# Patient Record
Sex: FEMALE | Race: ASIAN | NOT HISPANIC OR LATINO | Employment: OTHER | URBAN - METROPOLITAN AREA
[De-identification: names, ages, dates, MRNs, and addresses within clinical notes are randomized per-mention and may not be internally consistent; named-entity substitution may affect disease eponyms.]

---

## 2019-03-12 ENCOUNTER — OFFICE VISIT CONVERTED (OUTPATIENT)
Dept: FAMILY MEDICINE CLINIC | Facility: CLINIC | Age: 64
End: 2019-03-12
Attending: FAMILY MEDICINE

## 2019-09-12 ENCOUNTER — OFFICE VISIT CONVERTED (OUTPATIENT)
Dept: FAMILY MEDICINE CLINIC | Facility: CLINIC | Age: 64
End: 2019-09-12
Attending: FAMILY MEDICINE

## 2019-09-13 ENCOUNTER — HOSPITAL ENCOUNTER (OUTPATIENT)
Dept: FAMILY MEDICINE CLINIC | Facility: CLINIC | Age: 64
Discharge: HOME | End: 2019-09-13
Attending: FAMILY MEDICINE

## 2019-09-13 LAB
25(OH)D3 SERPL-MCNC: 31.6 NG/ML (ref 30–100)
ALBUMIN SERPL-MCNC: 4.6 G/DL (ref 3.5–5)
ALBUMIN/GLOB SERPL: 1.8 {RATIO} (ref 1.4–2.6)
ALP SERPL-CCNC: 68 U/L (ref 43–160)
ALT SERPL-CCNC: 12 U/L (ref 10–40)
ANION GAP SERPL CALC-SCNC: 17 MMOL/L (ref 8–19)
AST SERPL-CCNC: 20 U/L (ref 15–50)
BASOPHILS # BLD AUTO: 0.04 10*3/UL (ref 0–0.2)
BASOPHILS NFR BLD AUTO: 0.6 % (ref 0–3)
BILIRUB SERPL-MCNC: 0.54 MG/DL (ref 0.2–1.3)
BUN SERPL-MCNC: 11 MG/DL (ref 5–25)
BUN/CREAT SERPL: 18 {RATIO} (ref 6–20)
CALCIUM SERPL-MCNC: 9.1 MG/DL (ref 8.7–10.4)
CHLORIDE SERPL-SCNC: 106 MMOL/L (ref 99–111)
CONV ABS IMM GRAN: 0.02 10*3/UL (ref 0–0.2)
CONV CO2: 25 MMOL/L (ref 22–32)
CONV IMMATURE GRAN: 0.3 % (ref 0–1.8)
CONV TOTAL PROTEIN: 7.2 G/DL (ref 6.3–8.2)
CREAT UR-MCNC: 0.62 MG/DL (ref 0.5–0.9)
DEPRECATED RDW RBC AUTO: 39.2 FL (ref 36.4–46.3)
EOSINOPHIL # BLD AUTO: 0.05 10*3/UL (ref 0–0.7)
EOSINOPHIL # BLD AUTO: 0.8 % (ref 0–7)
ERYTHROCYTE [DISTWIDTH] IN BLOOD BY AUTOMATED COUNT: 11.7 % (ref 11.7–14.4)
EST. AVERAGE GLUCOSE BLD GHB EST-MCNC: 140 MG/DL
GFR SERPLBLD BASED ON 1.73 SQ M-ARVRAT: >60 ML/MIN/{1.73_M2}
GLOBULIN UR ELPH-MCNC: 2.6 G/DL (ref 2–3.5)
GLUCOSE SERPL-MCNC: 110 MG/DL (ref 65–99)
HBA1C MFR BLD: 6.5 % (ref 3.5–5.7)
HCT VFR BLD AUTO: 41.7 % (ref 37–47)
HGB BLD-MCNC: 13.4 G/DL (ref 12–16)
LYMPHOCYTES # BLD AUTO: 2.28 10*3/UL (ref 1–5)
LYMPHOCYTES NFR BLD AUTO: 34.8 % (ref 20–45)
MCH RBC QN AUTO: 29.8 PG (ref 27–31)
MCHC RBC AUTO-ENTMCNC: 32.1 G/DL (ref 33–37)
MCV RBC AUTO: 92.7 FL (ref 81–99)
MONOCYTES # BLD AUTO: 0.36 10*3/UL (ref 0.2–1.2)
MONOCYTES NFR BLD AUTO: 5.5 % (ref 3–10)
NEUTROPHILS # BLD AUTO: 3.81 10*3/UL (ref 2–8)
NEUTROPHILS NFR BLD AUTO: 58 % (ref 30–85)
NRBC CBCN: 0 % (ref 0–0.7)
OSMOLALITY SERPL CALC.SUM OF ELEC: 296 MOSM/KG (ref 273–304)
PLATELET # BLD AUTO: 252 10*3/UL (ref 130–400)
PMV BLD AUTO: 11.6 FL (ref 9.4–12.3)
POTASSIUM SERPL-SCNC: 4.5 MMOL/L (ref 3.5–5.3)
RBC # BLD AUTO: 4.5 10*6/UL (ref 4.2–5.4)
SODIUM SERPL-SCNC: 143 MMOL/L (ref 135–147)
WBC # BLD AUTO: 6.56 10*3/UL (ref 4.8–10.8)

## 2020-03-12 ENCOUNTER — HOSPITAL ENCOUNTER (OUTPATIENT)
Dept: FAMILY MEDICINE CLINIC | Facility: CLINIC | Age: 65
Discharge: HOME | End: 2020-03-12
Attending: FAMILY MEDICINE

## 2020-03-12 ENCOUNTER — OFFICE VISIT CONVERTED (OUTPATIENT)
Dept: FAMILY MEDICINE CLINIC | Facility: CLINIC | Age: 65
End: 2020-03-12
Attending: FAMILY MEDICINE

## 2020-03-12 LAB
EST. AVERAGE GLUCOSE BLD GHB EST-MCNC: 137 MG/DL
HBA1C MFR BLD: 6.4 % (ref 3.5–5.7)

## 2020-09-14 ENCOUNTER — CONVERSION ENCOUNTER (OUTPATIENT)
Dept: FAMILY MEDICINE CLINIC | Facility: CLINIC | Age: 65
End: 2020-09-14

## 2020-09-14 ENCOUNTER — OFFICE VISIT CONVERTED (OUTPATIENT)
Dept: FAMILY MEDICINE CLINIC | Facility: CLINIC | Age: 65
End: 2020-09-14
Attending: FAMILY MEDICINE

## 2020-09-15 ENCOUNTER — HOSPITAL ENCOUNTER (OUTPATIENT)
Dept: FAMILY MEDICINE CLINIC | Facility: CLINIC | Age: 65
Discharge: HOME | End: 2020-09-15
Attending: FAMILY MEDICINE

## 2020-09-15 LAB
25(OH)D3 SERPL-MCNC: 36.2 NG/ML (ref 30–100)
ALBUMIN SERPL-MCNC: 4.4 G/DL (ref 3.5–5)
ALBUMIN/GLOB SERPL: 1.6 {RATIO} (ref 1.4–2.6)
ALP SERPL-CCNC: 66 U/L (ref 43–160)
ALT SERPL-CCNC: 14 U/L (ref 10–40)
ANION GAP SERPL CALC-SCNC: 14 MMOL/L (ref 8–19)
AST SERPL-CCNC: 22 U/L (ref 15–50)
BASOPHILS # BLD AUTO: 0.04 10*3/UL (ref 0–0.2)
BASOPHILS NFR BLD AUTO: 0.7 % (ref 0–3)
BILIRUB SERPL-MCNC: 0.43 MG/DL (ref 0.2–1.3)
BUN SERPL-MCNC: 14 MG/DL (ref 5–25)
BUN/CREAT SERPL: 18 {RATIO} (ref 6–20)
CALCIUM SERPL-MCNC: 9.1 MG/DL (ref 8.7–10.4)
CHLORIDE SERPL-SCNC: 103 MMOL/L (ref 99–111)
CHOLEST SERPL-MCNC: 203 MG/DL (ref 107–200)
CHOLEST/HDLC SERPL: 3.1 {RATIO} (ref 3–6)
CONV ABS IMM GRAN: 0.02 10*3/UL (ref 0–0.2)
CONV CO2: 28 MMOL/L (ref 22–32)
CONV CREATININE URINE, RANDOM: 24 MG/DL (ref 10–300)
CONV IMMATURE GRAN: 0.3 % (ref 0–1.8)
CONV MICROALBUM.,U,RANDOM: <12 MG/L (ref 0–20)
CONV TOTAL PROTEIN: 7.1 G/DL (ref 6.3–8.2)
CREAT UR-MCNC: 0.76 MG/DL (ref 0.5–0.9)
DEPRECATED RDW RBC AUTO: 41.3 FL (ref 36.4–46.3)
EOSINOPHIL # BLD AUTO: 0.04 10*3/UL (ref 0–0.7)
EOSINOPHIL # BLD AUTO: 0.7 % (ref 0–7)
ERYTHROCYTE [DISTWIDTH] IN BLOOD BY AUTOMATED COUNT: 11.7 % (ref 11.7–14.4)
EST. AVERAGE GLUCOSE BLD GHB EST-MCNC: 126 MG/DL
GFR SERPLBLD BASED ON 1.73 SQ M-ARVRAT: >60 ML/MIN/{1.73_M2}
GLOBULIN UR ELPH-MCNC: 2.7 G/DL (ref 2–3.5)
GLUCOSE SERPL-MCNC: 107 MG/DL (ref 65–99)
HBA1C MFR BLD: 6 % (ref 3.5–5.7)
HCT VFR BLD AUTO: 45.1 % (ref 37–47)
HDLC SERPL-MCNC: 66 MG/DL (ref 40–60)
HGB BLD-MCNC: 13.7 G/DL (ref 12–16)
LDLC SERPL CALC-MCNC: 120 MG/DL (ref 70–100)
LYMPHOCYTES # BLD AUTO: 2.17 10*3/UL (ref 1–5)
LYMPHOCYTES NFR BLD AUTO: 35.9 % (ref 20–45)
MCH RBC QN AUTO: 29.1 PG (ref 27–31)
MCHC RBC AUTO-ENTMCNC: 30.4 G/DL (ref 33–37)
MCV RBC AUTO: 96 FL (ref 81–99)
MICROALBUMIN/CREAT UR: 50 MG/G{CRE} (ref 0–35)
MONOCYTES # BLD AUTO: 0.33 10*3/UL (ref 0.2–1.2)
MONOCYTES NFR BLD AUTO: 5.5 % (ref 3–10)
NEUTROPHILS # BLD AUTO: 3.44 10*3/UL (ref 2–8)
NEUTROPHILS NFR BLD AUTO: 56.9 % (ref 30–85)
NRBC CBCN: 0 % (ref 0–0.7)
OSMOLALITY SERPL CALC.SUM OF ELEC: 293 MOSM/KG (ref 273–304)
PLATELET # BLD AUTO: 245 10*3/UL (ref 130–400)
PMV BLD AUTO: 11.2 FL (ref 9.4–12.3)
POTASSIUM SERPL-SCNC: 4.3 MMOL/L (ref 3.5–5.3)
RBC # BLD AUTO: 4.7 10*6/UL (ref 4.2–5.4)
SODIUM SERPL-SCNC: 141 MMOL/L (ref 135–147)
TRIGL SERPL-MCNC: 87 MG/DL (ref 40–150)
VLDLC SERPL-MCNC: 17 MG/DL (ref 5–37)
WBC # BLD AUTO: 6.04 10*3/UL (ref 4.8–10.8)

## 2020-09-18 ENCOUNTER — HOSPITAL ENCOUNTER (OUTPATIENT)
Dept: CARDIOLOGY | Facility: HOSPITAL | Age: 65
Discharge: HOME | End: 2020-09-18
Attending: FAMILY MEDICINE

## 2020-10-14 ENCOUNTER — OFFICE VISIT CONVERTED (OUTPATIENT)
Dept: FAMILY MEDICINE CLINIC | Facility: CLINIC | Age: 65
End: 2020-10-14
Attending: FAMILY MEDICINE

## 2020-11-02 ENCOUNTER — HOSPITAL ENCOUNTER (OUTPATIENT)
Dept: GENERAL RADIOLOGY | Facility: HOSPITAL | Age: 65
Discharge: HOME | End: 2020-11-02
Attending: FAMILY MEDICINE

## 2020-11-02 LAB
CREAT BLD-MCNC: 0.7 MG/DL (ref 0.6–1.4)
GFR SERPLBLD BASED ON 1.73 SQ M-ARVRAT: >60 ML/MIN/{1.73_M2}

## 2020-11-23 ENCOUNTER — OFFICE VISIT CONVERTED (OUTPATIENT)
Dept: FAMILY MEDICINE CLINIC | Facility: CLINIC | Age: 65
End: 2020-11-23
Attending: FAMILY MEDICINE

## 2021-02-23 ENCOUNTER — OFFICE VISIT CONVERTED (OUTPATIENT)
Dept: FAMILY MEDICINE CLINIC | Facility: CLINIC | Age: 66
End: 2021-02-23
Attending: FAMILY MEDICINE

## 2021-04-07 ENCOUNTER — HOSPITAL ENCOUNTER (OUTPATIENT)
Dept: GENERAL RADIOLOGY | Facility: HOSPITAL | Age: 66
Discharge: HOME | End: 2021-04-07
Attending: FAMILY MEDICINE

## 2021-04-22 ENCOUNTER — OFFICE VISIT CONVERTED (OUTPATIENT)
Dept: FAMILY MEDICINE CLINIC | Facility: CLINIC | Age: 66
End: 2021-04-22
Attending: FAMILY MEDICINE

## 2021-05-13 NOTE — PROGRESS NOTES
"   Progress Note      Patient Name: Trish Cool   Patient ID: [id number]   Sex: Female   YOB: 1955    Primary Care Provider: Artis Arce DO   Referring Provider: Artis Arce DO    Visit Date: October 14, 2020    Provider: Artis Arce DO   [place] [facility] [place]   Location Address: [address] [place] [address], [address]  [place], [place]  [id number]   Location Phone: [phone]          Chief Complaint  · follow up      History Of Present Illness  Trish Cool is a 65 year old  female who presents for evaluation and treatment of:      Patient presents today for follow-up.  Last time I saw her she was having some issues with dizziness.  She denies that she has had a sudden onset of dizziness but does report having some feeling of drooping on the right side of her mouth a couple months ago.  This has not persisted.  When the last time I saw her she was also experiencing a feeling of a \"pinched nerve\" in her neck causing some radiation of pain and tingling down her left arm.  She describes mainly tingling.  Further work-up was ordered.  Her orthostatics were appropriate except for an increase in her heart rate when going from a seated to a standing position.  This was tested again today and was not reproducible as seated her heart rate was 70 and standing it was 68.  I did order an echo for further evaluation.  This showed normal left ventricular systolic function with EF of 60% with grade 1 diastolic dysfunction of left ventricle with mild tricuspid regurgitation and normal calculated pulmonary artery systolic pressure.  The cervical spine x-ray showed mild degenerative changes present with mild straightening of the normal cervical lordosis.  Carotid Doppler was negative with no significant atherosclerotic plaquing in the left or right common or internal carotid arteries.  Her labs were reviewed.  Her A1c has improved down to 6.0%.  She has no anemia.  Her vitamin D level is " appropriate.  She has a slightly elevated albumin/creatinine ratio.  Her lipid panel shows an LDL of 120 and a total cholesterol of 203 with an HDL of 66.  She would like to work on lifestyle modification including diet and exercise first and foremost.  She is not interested in adding medication.  She does report that at times her blood pressure seems to be low.  She has not been checking it at home.  Today it is 98/64.  She denies any symptoms from this.  On her echo it did show interestingly that the inferior vena cava had more than 50% collapse during inspiration.  I suspect she does have some volume depletion that is causing symptoms.  I discussed with her the importance of staying well-hydrated and increasing salt intake.  I discussed with her further evaluation of her tingling in her left arm with an MRI of the cervical spine.  She is not interested in starting medication today.  Given the symptoms she had as described in the beginning of the HPI I will order a head CT as well to rule out any intracranial abnormality including possible stroke.  I will see her back in about 1 month for close follow-up.       Past Medical History  Arthritis; Cervical cancer screening; Colon cancer screening; Hemorrhoids; Osteoporosis; Screening Mammogram         Past Surgical History  *Denies any surgical procedures         Medication List  calcium carbonate 500 mg calcium (1,250 mg) oral tablet; Claritin 10 mg oral tablet; metformin 500 mg oral tablet; Vitamin D2 1,250 mcg (50,000 unit) oral capsule         Allergy List  NO KNOWN DRUG ALLERGIES         Family Medical History  *No Known Family History         Social History  Alcohol; Tobacco (Never)         Immunizations  Name Date Admin   Influenza Refused         Review of Systems     General: Denies any fever, chills, weight changes, or night sweats  HEENT:  Denies any vision or hearing changes. Denies any neck tenderness. Denies any headaches. Denies nasal  "congestion  Cardiovascular: Denies any chest pain or palpitations  respiratory: Denies any cough or wheezing. Denies any shortness of breath  Gastrointestinal: Denies any nausea vomiting or diarrhea, Denies constipation  Extremities: Denies any edema  Psychiatric: Denies any changes in mood or affect  Neurologic: As discussed above  skin: Denies any rashes or lesions.  endocrine: Denies any weight loss, fever, night sweats  Musculoskeletal: Denies any weakness       Vitals  Date Time BP Position Site L\R Cuff Size HR RR TEMP (F) WT  HT  BMI kg/m2 BSA m2 O2 Sat FR L/min FiO2 HC       10/14/2020 03:24 PM 98/64 Sitting    66 - R  98.2 136lbs 1oz 5'  7\" 21.31 1.71 98 %            Physical Examination     General: AAO 3, no acute distress, pleasant  HEENT: Normocephalic, atraumatic  Cardiovascular: Regular rate and rhythm without appreciable murmur  Respiratory: Clear to auscultation bilaterally no RRW  Gastrointestinal: Soft nontender nondistended with bowel sounds present  Musculoskeletal: Paraspinal hypertonicity of the cervical spine.  Nontender to palpation.  extremities: No clubbing, cyanosis or edema  Neurologic: CN II through XII grossly intact   Psychiatric: Normal mood and affect           Assessment  · Diabetes mellitus, type 2     250.00/E11.9  · Need for influenza vaccination     V04.81/Z23  · Cervical radiculopathy     723.4/M54.12  · Arm paresthesia, left     782.0/R20.2  · Stroke-like symptom     781.99/R29.90  · Dizziness     780.4/R42  · HLD (hyperlipidemia)     272.4/E78.5  · Medication monitoring encounter     V58.83/Z51.81  · Diastolic dysfunction     429.9/I51.89      Plan  · Orders  o ACO-39: Current medications updated and reviewed (, 1159F) - - 10/14/2020  o ACO-14: Influenza immunization administered or previously received [facility] () - - 10/14/2020  o MRI cervical spine wo contrast (75814) - 723.4/M54.12, 782.0/R20.2 - 10/14/2020   arm tingling for over a month. no injury  o CT Head/Brain " without IV Contrast [facility] (40901) - 782.0/R20.2, 781.99/R29.90, 780.4/R42 - 10/14/2020  · Instructions  o Patient was educated/instructed on their diagnosis, treatment and medications prior to discharge from the clinic today.  o Patient instructed to seek medical attention urgently for new or worsening symptoms.  o Call the office with any concerns or questions.  o Plan as documented above. I will see patient back in about 1 month or sooner if needed. Patient to call with any questions or concerns.  · Disposition  o Follow Up in 1 month.            Electronically Signed by: Artis Arce DO -Author on October 14, 2020 05:41:50 PM

## 2021-05-13 NOTE — PROGRESS NOTES
"   Progress Note      Patient Name: Trish Cool   Patient ID: [id number]   Sex: Female   YOB: 1955    Primary Care Provider: Artis Arce DO   Referring Provider: Artis Arce DO    Visit Date: September 14, 2020    Provider: Artis Arce DO   Location: [facility]   Location Address: [address] [place] [address], [address]  [place], [place]  [id number]   Location Phone: [phone]          Chief Complaint  · 6 month follow up      History Of Present Illness  Trish Cool is a 65 year old  female who presents for evaluation and treatment of:      Patient presents today for checkup.  She reports having dizziness at times.  Does not really occur with position changes although she was dizzy with sitting up today after having her EKG done.  She denies any chest pain.  She does have a \"pinched nerve\" in her neck that does cause some radiation of pain and numbness and tingling down her left arm.  This is not been evaluated in a while.  Discussed getting an x-ray.  She does not have any history of coronary artery disease.  I did order an EKG.  This showed sinus bradycardia with a ventricular rate of 59 bpm.  There is no axis deviation, [place] interval is 164 ms, QRS durations 84 ms, QTc interval is 422 ms.  There are no acute ST or T wave changes.  There does appear to be a 1 mm ST elevation in V2 but this is not noted in any contiguous leads.  She was otherwise asymptomatic during EKG but did get lightheaded when sitting up.  Orthostatics were performed.  Lying down heart rate was 68 with blood pressure 122/76, seated it was 70 heart rate with blood pressure 116/82 and standing it was 99 heart rate with blood pressure 118/68.  The heart rate increased was significant as it increased 30 beats from lying down.  She did have lightheadedness when going from lying down to seated position.  She has not passed out but feels like she could potentially.  She has not taken any medication for " "this.  Depression screening has been reviewed and it is negative.  She declines pneumococcal vaccine.  She does report some sinus fullness.       Past Medical History  Arthritis; Cervical cancer screening; Colon cancer screening; Hemorrhoids; Osteoporosis; Screening Mammogram         Past Surgical History  *Denies any surgical procedures         Medication List  calcium carbonate 500 mg calcium (1,250 mg) oral tablet; Claritin 10 mg oral tablet; metformin 500 mg oral tablet; Vitamin D2 1,250 mcg (50,000 unit) oral capsule         Allergy List  NO KNOWN DRUG ALLERGIES         Family Medical History  *No Known Family History         Social History  Alcohol; Tobacco (Never)         Immunizations  Name Date Admin   Influenza Refused         Review of Systems     General: Denies any fever, chills, weight changes, or night sweats  HEENT: As discussed above  Cardiovascular: She denies any chest pain or palpitations.  Presyncope as discussed above  respiratory: Denies any cough or wheezing. Denies any shortness of breath  Gastrointestinal: Denies any nausea vomiting or diarrhea, Denies constipation  Extremities: Denies any edema  Psychiatric: Denies any changes in mood or affect  Neurologic: As discussed above.  Denies any spinning sensation  skin: Denies any rashes or lesions.  endocrine: Denies any weight loss, fever, night sweats  Musculoskeletal: As discussed above       Vitals  Date Time BP Position Site L\R Cuff Size HR RR TEMP (F) WT  HT  BMI kg/m2 BSA m2 O2 Sat HC       09/14/2020 10:26 /76 Sitting    64 - R  97.3 136lbs 2oz 5'  7\" 21.32 1.71 99 %        09/14/2020 11:35 /76 Supine    68 - R   09/14/2020 11:35 /82 Sitting    70 - R   09/14/2020 11:35 /86 Standing    65 - R             Physical Examination     General: AAO 3, no acute distress, pleasant  HEENT: Normocephalic, atraumatic, no discharge in the eyes, no discharge from the nose, no oropharyngeal erythema or exudates, and TMs intact " bilaterally with no erythema, no cervical tenderness or lymphadenopathy.  No carotid bruits  Cardiovascular: Regular rate and rhythm without appreciable murmur  Respiratory: Clear to auscultation bilaterally no RRW  Gastrointestinal: Soft nontender nondistended with bowel sounds present  extremities: No clubbing, cyanosis or edema  Neurologic: CN II through XII grossly intact.  No nystagmus with David-Hallpike on the left but she did feel dizzy.  Denies any spinning sensation  Psychiatric: Normal mood and affect           Assessment  · Screening for depression     V79.0/Z13.89  · Need for influenza vaccination     V04.81/Z23  · Cervical radiculopathy     723.4/M54.12  · Allergic sinusitis     477.9/J30.9  · Dizziness     780.4/R42  · Pre-syncope     780.2/R55  · POTS (postural orthostatic tachycardia syndrome)?     427.89/I49.8  · Neck pain     723.1/M54.2  · Bradycardia     427.89/R00.1  · Lightheadedness     780.4/R42      Plan  · Orders  o ACO-14: Influenza immunization was not administered for reasons documented () - V04.81/Z23 - 09/14/2020   declines  o ACO-39: Current medications updated and reviewed () - - 09/14/2020  o ACO-18: Negative screen for clinical depression using a standardized tool () - - 09/14/2020   0 points  o ACO-13: Fall Risk Screening with no falls in past year or only one fall without injury in the past year (1101F) - - 09/14/2020  o ACO - Pt declines to or was not able to provide an Advance Care Plan or name a Surrogate Decision Maker (1124F) - - 09/14/2020  o Xray cervical spine complete [facility] (69399) - 723.4/M54.12, 723.1/M54.2 - 09/14/2020  o Echocardiogram - Complete [facility] (98827, 07529, 22443) - 780.4/R42, 780.2/R55 - 09/14/2020  o US carotid artery intravascular (57669, 67451, 32372) - 780.4/R42, 780.2/R55 - 09/14/2020  o EKG (Recording and Interpretation) [facility] (Done and read at [facility]) (73309) - 427.89/R00.1 - 09/14/2020   Performed by Mireya Choudhary,  MA  · Medications  o Medications have been Reconciled  o Transition of Care or Provider Policy  · Instructions  o Depression Screen completed and scanned into the EMR under the designated folder within the patient's documents.  o Today's PHQ-9 result is _0__  o Patient was educated/instructed on their diagnosis, treatment and medications prior to discharge from the clinic today.  o Patient instructed to seek medical attention urgently for new or worsening symptoms.  o Call the office with any concerns or questions.  o Discussed getting a carotid Doppler as well as echocardiogram for further evaluation. As far as the radiation of pain down her left arm I suspect this is due to cervical disc disease. I will get an x-ray for further evaluation. She does not describe any chest pain or concerns about ischemic heart disease at this time. Plan to keep close follow-up with her in 1 month. If she does have any syncope she is instructed to go the emergency room.  o Her heart rate did increase from the seated to standing position 31 bpm. It is possible that she may have postural tachycardia syndrome. She is borderline with this. Plan to monitor for now. She denies any palpitations.  · Disposition  o Follow Up in 1 month.            Electronically Signed by: Artis Arce DO -Author on September 14, 2020 01:14:51 PM

## 2021-05-13 NOTE — PROGRESS NOTES
Progress Note      Patient Name: Trish Cool   Patient ID: [id number]   Sex: Female   YOB: 1955    Primary Care Provider: Artis Arce DO   Referring Provider: Artis Arce DO    Visit Date: November 23, 2020    Provider: Artis Arce DO   Location: [facility]   Location Address: [address] [place] [address], [address]  [place], [place]  [id number]   Location Phone: [phone]          Chief Complaint  · follow up      History Of Present Illness  Trish Cool is a 65 year old  female who presents for evaluation and treatment of:      Patient presents for follow-up.  Since last time I saw her she has been taking in more fluids.  Blood pressure is looking somewhat better today with systolic being 102.  She denies having any more feelings of dizziness or lightheadedness.  We previously checked orthostatics which were appropriate.  Echocardiogram was also done which showed normal EF of 60% with grade 1 diastolic dysfunction.  Clinically she has no evidence of CHF.  I did order a head CT to further evaluate to rule out any stroke.  She did have calcification within the basal ganglia which was nonspecific.  Per radiology and consideration it could be physiologic or related to metabolic disorder including the parathyroid or thyroid.  It could be inherited or sequela of a toxic event.  There is some cerebellar atrophy.  She denies any unsteadiness when she walks.  I discussed with her having her labs repeated in 3 months.  She does have diabetes which would certainly be included on metabolic disorders.  Her calcium level has been normal on review his labs.  We discussed for now monitoring and repeating her labs in 3 months and then reconsidering at that time if any further evaluation is needed.  For now I encouraged her to stay well-hydrated.  Her MRI of the cervical spine was also done due to left upper extremity neuropathy.  She does have mild degenerative changes greatest at C5-C6  "with no evidence of high-grade central canal stenosis or high-grade foraminal stenosis but she does have mild central canal stenosis and mild bilateral foraminal stenosis at C5-C6.  We discussed treatment options including physical therapy, injections, or medication.  She declines any treatments at this time and she would like to monitor symptoms for now and if symptoms worsen she is instructed to call or return.  She does need her mammogram.  She declines pneumococcal vaccine.       Past Medical History  Arthritis; Cervical cancer screening; Colon cancer screening; Diabetes; Hemorrhoids; Osteoporosis; Screening Mammogram         Past Surgical History  *Denies any surgical procedures         Medication List  calcium carbonate 500 mg calcium (1,250 mg) oral tablet; Claritin 10 mg oral tablet; metformin 500 mg oral tablet; Vitamin D2 1,250 mcg (50,000 unit) oral capsule         Allergy List  NO KNOWN DRUG ALLERGIES       Allergies Reconciled  Family Medical History  *No Known Family History         Social History  Alcohol; Tobacco (Never)         Immunizations  Name Date Admin   Influenza Refused         Review of Systems     Gen: Denies any fever, chills, or weight changes  HEENT: Denies any changes in vision or hearing, denies nasal congestion, denies any neck tenderness or lymphadenopathy  Extremities: Denies edema  Psychiatric: Denies any changes in mood or affect  Neurologic: Denies any deficits  Skin: Denies any rashes       Vitals  Date Time BP Position Site L\R Cuff Size HR RR TEMP (F) WT  HT  BMI kg/m2 BSA m2 O2 Sat FR L/min FiO2        11/23/2020 03:39 /58 Sitting    66 - R  97.7 136lbs 2oz 5'  7\" 21.32 1.71 97 %            Physical Examination     General: AAO 3, no acute distress, pleasant  HEENT: Normocephalic, atraumatic  Cardiovascular: Regular rate and rhythm without appreciable murmur  Respiratory: Clear to auscultation bilaterally no RRW  Gastrointestinal: Soft nontender nondistended with " bowel sounds present  extremities: No clubbing, cyanosis or edema  Neurologic: CN II through XII grossly intact   Psychiatric: Normal mood and affect           Assessment  · Diabetes mellitus, type 2     250.00/E11.9  · Fatigue     780.79/R53.83  · Osteoporosis     733.00/M81.0  · Vitamin D deficiency     268.9/E55.9  · Visit for screening mammogram     V76.12/Z12.31  · Medication monitoring encounter     V58.83/Z51.81  · HLD (hyperlipidemia)     272.4/E78.5  · Abnormal brain MRI     793.0/R90.89  Plan as documented above. Discussed seeing patient back in 3 months or sooner if needed. I encouraged patient to call with any questions or concerns. I encouraged her to stay well-hydrated. Discussed checking her thyroid and parathyroid. We will also repeat her A1c.      Plan  · Orders  o Screening Mammography; Bilateral 3D (58279, , 16925) - V76.12/Z12.31 - 11/23/2020   Requesting to have done at [facility] [address]  o CBC with Auto Diff [facility] (95942) - 250.00/E11.9, V58.83/Z51.81 - 02/23/2021  o CMP [facility] (53756) - 250.00/E11.9, V58.83/Z51.81 - 02/23/2021  o Hgb A1c [facility] (65434) - 250.00/E11.9, V58.83/Z51.81 - 02/23/2021  o Lipid Panel [facility] (16622) - 272.4/E78.5 - 02/23/2021  o Thyroid Profile (02320, 81926, THYII) - 780.79/R53.83 - 02/23/2021  o Vitamin D (25-Hydroxy) Level (59491) - 268.9/E55.9, V58.83/Z51.81 - 02/23/2021  o ACO-39: Current medications updated and reviewed (, 1159F) - - 11/23/2020  o ACO-14: Influenza immunization administered or previously received [facility] () - - 11/23/2020  o ACO-14: Influenza immunization was not administered for reasons documented [facility] () - - 11/23/2020  o PTH (05114) - 250.00/E11.9, V58.83/Z51.81, 733.00/M81.0 - 02/23/2021  · Medications  o Medications have been Reconciled  o Transition of Care or Provider Policy  · Instructions  o Patient was educated/instructed on their diagnosis, treatment and medications prior to discharge from the clinic today.  o Patient instructed to  seek medical attention urgently for new or worsening symptoms.  o Call the office with any concerns or questions.  · Disposition  o Follow Up in 3 months.            Electronically Signed by: Artis Arce DO - on November 23, 2020 06:12:27 PM

## 2021-05-14 VITALS
HEIGHT: 67 IN | HEART RATE: 67 BPM | OXYGEN SATURATION: 99 % | BODY MASS INDEX: 21.4 KG/M2 | SYSTOLIC BLOOD PRESSURE: 106 MMHG | TEMPERATURE: 97.5 F | WEIGHT: 136.37 LBS | DIASTOLIC BLOOD PRESSURE: 62 MMHG

## 2021-05-14 VITALS
HEIGHT: 67 IN | TEMPERATURE: 98 F | SYSTOLIC BLOOD PRESSURE: 108 MMHG | WEIGHT: 136.56 LBS | DIASTOLIC BLOOD PRESSURE: 72 MMHG | BODY MASS INDEX: 21.43 KG/M2 | HEART RATE: 69 BPM | OXYGEN SATURATION: 99 %

## 2021-05-14 VITALS
HEART RATE: 66 BPM | SYSTOLIC BLOOD PRESSURE: 98 MMHG | BODY MASS INDEX: 21.36 KG/M2 | TEMPERATURE: 98.2 F | WEIGHT: 136.06 LBS | DIASTOLIC BLOOD PRESSURE: 64 MMHG | HEIGHT: 67 IN | OXYGEN SATURATION: 98 %

## 2021-05-14 VITALS
OXYGEN SATURATION: 97 % | HEART RATE: 66 BPM | BODY MASS INDEX: 21.36 KG/M2 | DIASTOLIC BLOOD PRESSURE: 58 MMHG | SYSTOLIC BLOOD PRESSURE: 102 MMHG | HEIGHT: 67 IN | WEIGHT: 136.12 LBS | TEMPERATURE: 97.7 F

## 2021-05-14 VITALS
DIASTOLIC BLOOD PRESSURE: 76 MMHG | HEART RATE: 64 BPM | WEIGHT: 136.12 LBS | OXYGEN SATURATION: 99 % | HEIGHT: 67 IN | SYSTOLIC BLOOD PRESSURE: 118 MMHG | BODY MASS INDEX: 21.36 KG/M2 | TEMPERATURE: 97.3 F

## 2021-05-14 VITALS — SYSTOLIC BLOOD PRESSURE: 118 MMHG | HEART RATE: 65 BPM | DIASTOLIC BLOOD PRESSURE: 86 MMHG

## 2021-05-14 NOTE — PROGRESS NOTES
Progress Note      Patient Name: Trish Cool   Patient ID: [id number]   Sex: Female   YOB: 1955    Primary Care Provider: Artis Arce DO   Referring Provider: Artis Arce DO    Visit Date: February 23, 2021    Provider: Artis Arce DO   Location: [facility]   Location Address: [address] [place] [address], [address]  [place], [place]  [id number]   Location Phone: [phone]          Chief Complaint  · follow up      History Of Present Illness  Trish Cool is a 65 year old  female who presents for evaluation and treatment of:      [place] today for follow-up.  She had labs done prior to appointment.  She had noted on a CT of the brain calcification the basal ganglia which could be physiologic and is nonspecific and could reflect metabolic disorder including parathyroid and hypothyroidism.  She does have type 2 diabetes which may be contributory.  We recently checked her TSH and free T4 both of which were normal.  Vitamin D level was adequate.  Parathyroid hormone came back slightly elevated at 74.21 the upper limits being 65.  I discussed having this repeated with an ionized calcium as her calcium level also returned normal.  She denies any history of kidney stones, she has no chronic kidney disease.  And again, she has a normal calcium level at 9.7.  She does have osteoporosis however.  She had her last test done several years ago.  She has declined treatment previously.  Discussed getting a DEXA scan to further evaluate.  She had labs recently done to repeat the PTH and ionized calcium.  I will request these labs from [place] as they were done last week and have not returned to us yet.  She is due for mammogram and will get this done in April.  This has been ordered already.  She is up-to-date on her colonoscopy.       Past Medical History  Arthritis; Cervical cancer screening; Colon cancer screening; Diabetes; Hemorrhoids; Osteoporosis; Screening Mammogram         Past  "Surgical History  *Denies any surgical procedures; Colonoscopy         Medication List  calcium carbonate 500 mg calcium (1,250 mg) oral tablet; Claritin 10 mg oral tablet; metformin 500 mg oral tablet; Vitamin D2 1,250 mcg (50,000 unit) oral capsule         Allergy List  NO KNOWN DRUG ALLERGIES         Family Medical History  *No Known Family History         Social History  Alcohol; Tobacco (Never)         Immunizations  Name Date Admin   Influenza Refused         Review of Systems     Gen: Denies any fever, chills, or weight changes  Extremities: Denies edema  Psychiatric: Denies any changes in mood or affect  Neurologic: She describes in her left leg occasionally feel like there is a worm crawling up it.  She has not had this for several weeks.  She denies any muscle tightness or spasm.  Musculoskeletal: Denies any muscle spasm or tightness.  Skin: Denies any rashes       Vitals  Date Time BP Position Site L\R Cuff Size HR RR TEMP (F) WT  HT  BMI kg/m2 BSA m2 O2 Sat FR L/min FiO2 HC       02/23/2021 09:51 /72 Sitting    69 - R  98 136lbs 9oz 5'  7\" 21.39 1.71 99 %            Physical Examination     General: AAO 3, no acute distress, pleasant  HEENT: Normocephalic, atraumatic  Cardiovascular: Regular rate and rhythm without appreciable murmur  Respiratory: Clear to auscultation bilaterally no RRW  Gastrointestinal: Soft nontender nondistended with bowel sounds present  extremities: No edema.  No varicose veins.  No tenderness to palpation in the left lower extremity where she had described the previous abnormality.  Neurologic: CN II through XII grossly intact   Psychiatric: Normal mood and affect           Assessment  · Diabetes mellitus, type 2     250.00/E11.9  · Osteoporosis     733.00/M81.0  · Medication monitoring encounter     V58.83/Z51.81  · HLD (hyperlipidemia)     272.4/E78.5  · Hyperparathyroidism     252.00/E21.3      Plan  · Orders  o CBC with Auto Diff [facility] (60789) - 250.00/E11.9, " V58.83/Z51.81 - 05/23/2021  o CMP [facility] (05183) - 250.00/E11.9, V58.83/Z51.81 - 05/23/2021  o Lipid Panel [facility] (84859) - 272.4/E78.5 - 05/23/2021  o ACO-14: Influenza immunization administered or previously received [facility] () - - 02/23/2021  o ACO - Pt declines to or was not able to provide an Advance Care Plan or name a Surrogate Decision Maker (1124F) - - 02/23/2021  o ACO-39: Current medications updated and reviewed (1159F, ) - - 02/23/2021  o DEXA Bone Density, 1 or more sites, axial skeleton [facility] (53101) - 733.00/M81.0 - 02/23/2021  · Medications  o atorvastatin 10 mg oral tablet   SIG: take 1 tablet (10 mg) by oral route once daily at bedtime for cholesterol   DISP: (90) Tablet with 1 refills  Prescribed on 02/23/2021     o metformin 500 mg oral tablet   SIG: take 1 tablet (500 mg) by oral route daily   DISP: (90) Tablet with 3 refills  Adjusted on 02/23/2021     o calcium carbonate 500 mg calcium (1,250 mg) oral tablet   SIG: take 2 tablets by oral route daily for 90 days   DISP: (180) Tablet with 3 refills  Refilled on 02/23/2021     o Claritin 10 mg oral tablet   SIG: take 1 tablet (10 mg) by oral route once daily for 90 days   DISP: (90) Tablet with 3 refills  Refilled on 02/23/2021     o Vitamin D2 1,250 mcg (50,000 unit) oral capsule   SIG: take 1 capsule (50,000 unit) by oral route once weekly for 90 days   DISP: (13) Capsule with 3 refills  Refilled on 02/23/2021     · Instructions  o Patient was educated/instructed on their diagnosis, treatment and medications prior to discharge from the clinic today.  o Patient instructed to seek medical attention urgently for new or worsening symptoms.  o Call the office with any concerns or questions.  o Plan as documented above. Discussed getting a DEXA scan for further evaluation of elevated PTH. May consider further evaluation at that time depending on results. We also check her ionized calcium. Again, I am requesting records from [place]. She has no  history of kidney stones, no chronic kidney disease and no elevated calcium level but she does have osteoporosis. Her LDL is elevated. I will start her on atorvastatin. Plan on seeing her back in 3 months with labs repeated prior to next appointment. No discernible cause for her symptoms she is having left lower extremity which has resolved. If she has any recurrence she is instructed to call or return.  · Disposition  o Follow Up in 3 months.            Electronically Signed by: Artis Arce DO -Author on February 23, 2021 10:53:07 AM

## 2021-05-14 NOTE — PROGRESS NOTES
Progress Note      Patient Name: Trish Cool   Patient ID: [id number]   Sex: Female   YOB: 1955    Primary Care Provider: Artis Arce DO   Referring Provider: Artis Arce DO    Visit Date: April 22, 2021    Provider: Artis Arce DO   Location: [facility]   Location Address: [address] [place] [address], [address] [place] [address]  [id number]   Location Phone: [phone]          Chief Complaint  · 3 mo      History Of Present Illness  Trish Cool is a 65 year old  female who presents for evaluation and treatment of:      Patient presents today for follow-up.  Last time I saw her we discussed findings on her head CT shown calcifications in the basal ganglia which could be physiologic and nonspecific and could reflect a metabolic disorder including parathyroidism or hypothyroidism.  She does have type 2 diabetes which may be contributory.  TSH and free T4 were both normal.  She did have a parathyroid hormone level that came back slightly elevated at 74.21.  We discussed having her labs repeated.  She had an ionized calcium that came back normal.  She has no history of kidney stones or chronic kidney disease.  She does have a normal calcium level.  She does have osteoporosis.  Her labs have returned that I previously did not have after the last visit.  Her ionized calcium came back normal with the repeat parathyroid hormone level being normal at 55.42.  Discussed monitoring for now as the calcium level is within normal limits and the parathyroid hormone level returned normal.  Recent mammogram came back normal at [facility]-RADS 2-benign.       Past Medical History  Arthritis; Cervical cancer screening; Colon cancer screening; Diabetes; Hemorrhoids; Osteoporosis; Screening Mammogram         Past Surgical History  *Denies any surgical procedures; Colonoscopy         Medication List  atorvastatin 10 mg oral tablet; calcium carbonate 500 mg calcium (1,250 mg) oral tablet; Claritin 10  "mg oral tablet; metformin 500 mg oral tablet; Pfizer COVID19 Vacc (Unapprov) 30 mcg/0.3 mL intramuscular suspension for reconstitution; Vitamin D2 1,250 mcg (50,000 unit) oral capsule         Allergy List  NO KNOWN DRUG ALLERGIES         Family Medical History  *No Known Family History         Social History  Alcohol; Tobacco (Never)         Immunizations  Name Date Admin   Influenza Refused         Review of Systems     Gen: Denies any fever, chills, or weight changes  Extremities: Denies edema  Psychiatric: Denies any changes in mood or affect  Neurologic: Denies any deficits  Skin: Denies any rashes       Vitals  Date Time BP Position Site L\R Cuff Size HR RR TEMP (F) WT  HT  BMI kg/m2 BSA m2 O2 Sat FR L/min FiO2 HC       04/22/2021 10:35 /62 Sitting    67 - R  97.5 136lbs 6oz 5'  7\" 21.36 1.71 99 %            Physical Examination     General: AAO 3, no acute distress, pleasant  HEENT: Normocephalic, atraumatic  Cardiovascular: Regular rate and rhythm without appreciable murmur  Respiratory: Clear to auscultation bilaterally no RRW  Gastrointestinal: Soft nontender nondistended with bowel sounds present  extremities: No edema  Neurologic: CN II through XII grossly intact   Psychiatric: Normal mood and affect           Assessment  · Diabetes mellitus, type 2     250.00/E11.9  · Osteoporosis     733.00/M81.0  · Hyperparathyroidism     252.00/E21.3  · Medication monitoring encounter     V58.83/Z51.81  · HLD (hyperlipidemia)     272.4/E78.5      Plan  · Orders  o CBC with Auto Diff [facility] (74155) - V58.83/Z51.81, 250.00/E11.9 - 07/22/2021  o CMP [facility] (47173) - V58.83/Z51.81, 250.00/E11.9 - 07/22/2021  o Hgb A1c [facility] (66319) - V58.83/Z51.81, 250.00/E11.9 - 07/22/2021  o Lipid Panel [facility] (28729) - V58.83/Z51.81, 272.4/E78.5 - 07/22/2021  o ACO-39: Current medications updated and reviewed (, 1159F) - - 04/22/2021  o PTH (parathyroid hormone) measurement (37161) - 252.00/E21.3, V58.83/Z51.81 - 07/22/2021  o Calcium " (Ionized) (20789) - 252.00/E21.3, V58.83/Z51.81, 250.00/E11.9 - 07/22/2021  · Medications  o Medications have been Reconciled  o Transition of Care or Provider Policy  · Instructions  o Patient was educated/instructed on their diagnosis, treatment and medications prior to discharge from the clinic today.  o Patient instructed to seek medical attention urgently for new or worsening symptoms.  o Call the office with any concerns or questions.  o Calcium level has returned within normal limits. We discussed monitoring for now and having labs repeated in 3 months. I will repeat her PTH. If she continues to have elevated levels specially given her history of osteoporosis discussed with her further evaluation including a parathyroid scan. Further recommendations to follow depending on results. I will see her back in 3 months or sooner if needed. Patient instructed to call with any questions or concerns.  · Disposition  o Follow Up in 3 months.            Electronically Signed by: Artis Arce DO -Author on April 22, 2021 12:32:04 PM

## 2021-05-15 VITALS
OXYGEN SATURATION: 98 % | DIASTOLIC BLOOD PRESSURE: 64 MMHG | TEMPERATURE: 97.8 F | SYSTOLIC BLOOD PRESSURE: 102 MMHG | HEIGHT: 67 IN | HEART RATE: 64 BPM | WEIGHT: 140 LBS | BODY MASS INDEX: 21.97 KG/M2

## 2021-05-15 VITALS
TEMPERATURE: 98 F | HEIGHT: 67 IN | WEIGHT: 141 LBS | OXYGEN SATURATION: 98 % | HEART RATE: 67 BPM | BODY MASS INDEX: 22.13 KG/M2 | SYSTOLIC BLOOD PRESSURE: 110 MMHG | DIASTOLIC BLOOD PRESSURE: 64 MMHG

## 2021-05-15 VITALS
HEART RATE: 70 BPM | HEIGHT: 68 IN | DIASTOLIC BLOOD PRESSURE: 62 MMHG | BODY MASS INDEX: 21.1 KG/M2 | TEMPERATURE: 97.9 F | SYSTOLIC BLOOD PRESSURE: 100 MMHG | WEIGHT: 139.25 LBS | OXYGEN SATURATION: 97 %

## 2021-05-18 ENCOUNTER — OFFICE VISIT CONVERTED (OUTPATIENT)
Dept: FAMILY MEDICINE CLINIC | Facility: CLINIC | Age: 66
End: 2021-05-18
Attending: FAMILY MEDICINE

## 2021-06-06 NOTE — PROGRESS NOTES
Progress Note      Patient Name: Trish Cool   Patient ID: [id number]   Sex: Female   YOB: 1955    Primary Care Provider: Artis Arce DO   Referring Provider: Artis Arce DO    Visit Date: May 18, 2021    Provider: Artis Arce DO   Location: [facility]   Location Address: [address] [place] [address], [address] [place] [address]  [id number]   Location Phone: [phone]          Chief Complaint  · Welcome to Medicare Visit      History Of Present Illness  The patient is a 66 year old  female who has come to this office for her Welcome to Medicare Visit. Her Primary Care Provider is Artis Arce DO. Her comprehensive Care Team list, including suppliers, has been updated on the Facesheet. Her medical/surgical/family history, height, weight, BMI, and blood pressure have been reviewed and are in the chart.   Medications are listed in the medication list.   The active problem list includes: Arthritis and Osteoporosis   The patient does not have a history of substance use.   Patient reports her diet is adequate.   Patient reports her physical activity is adequate.   A hearing loss screen was completed today and the result is negative.   Patient does not have any risk factors for depression. Patient completed the PHQ-9 today and it has been scanned in the chart. The total score is 1-4.   The Timed-Up-and-Go screen was administered today and the result is negative.   The Martini Index of [place] in ADLs indicated full function (score of 6).   A Falls Risk Assessment has been completed, including a review of home fall hazards and medication review.   Her level of safety is noted to be within normal limits. Her balance/gait is within normal limits. There have been no falls in the past year. Patient-specific home safety recommendations have been reviewed and a copy has been given to patient.   She denies issues with leaking urine.   There are no additional risk factors  identified.   Living Will/Advanced Directive previously executed but not in chart.   Personalized health advice was given to the patient and a written health screening schedule was established; see Plan for details.   Trish Cool is a 66 year old  female who presents for evaluation and treatment of:      Patient presents for Medicare annual wellness visit.  Her last LDL cholesterol was 141.  She has atorvastatin listed but declines taking it.  She would like to have it reassessed when labs are repeated when she returns for follow-up in 2 months.  Depression screening has been reviewed and it is negative.  She is not interested in any nutritional counseling for diabetes.  Her A1c back in September 2020 was 6.0%.  It was 6.0% as well in January 2021.  She is due to have a DEXA scan.  She had to reschedule her appointment recently that was supposed to be on 5/5/2021.  She is not interested in screening for HIV or hepatitis C.  She declines hepatitis B vaccine.  She is due for pneumococcal 13 vaccination.  She will come back later on this afternoon to have this done.  She will need 23 a year from now.  She does not have a living will and she states she will work on this.  She denies any issues with hearing loss, depression screening is negative, her tug test is normal.  She does not drink alcohol.  She is able to do her activities of daily living and ping a normal clock drawing test.       Past Medical History  Arthritis; Cervical cancer screening; Colon cancer screening; Diabetes; Hemorrhoids; Osteoporosis; Screening Mammogram         Past Surgical History  *Denies any surgical procedures; Colonoscopy         Medication List  atorvastatin 10 mg oral tablet; calcium carbonate 500 mg calcium (1,250 mg) oral tablet; Claritin 10 mg oral tablet; metformin 500 mg oral tablet; Pfizer COVID19 Vacc (Unapprov) 30 mcg/0.3 mL intramuscular suspension for reconstitution; Vitamin D2 1,250 mcg (50,000 unit) oral capsule  "        Allergy List  NO KNOWN DRUG ALLERGIES       Allergies Reconciled  Family Medical History  *No Known Family History         Social History  Alcohol; Tobacco (Never)         Immunizations  Name Date Admin   Influenza Refused         Vitals  Date Time BP Position Site L\R Cuff Size HR RR TEMP (F) WT  HT  BMI kg/m2 BSA m2 O2 Sat FR L/min FiO2 HC       05/18/2021 10:43 /74 Sitting    65 - R  98.1 133lbs 8oz 5'  7\" 20.91 1.69 98 %            Physical Examination     General: AAO 3, no acute distress, pleasant  HEENT: Normocephalic, atraumatic  Cardiovascular: Regular rate and rhythm without appreciable murmur  Respiratory: Clear to auscultation bilaterally no RRW  Gastrointestinal: Soft nontender nondistended with bowel sounds present  extremities: No edema  Neurologic: CN II through XII grossly intact   Psychiatric: Normal mood and affect           Assessment  · Welcome to Medicare preventive visit     V70.0/Z00.00  · Screening for depression     V79.0/Z13.89  · Screening for alcoholism     V79.1/Z13.39  · Diabetes mellitus, type 2     250.00/E11.9      Plan  · Orders  o Falls Risk Assessment Completed (3288F) - V70.0/Z00.00 - 05/18/2021  o Brief hearing screening (written) [facility] () - V70.0/Z00.00 - 05/18/2021  o Welcome to Medicare Exam () - V70.0/Z00.00 - 05/18/2021  o Annual alcohol screening using the AUDIT-C tool, 15 minutes [facility] (61068, ) - V79.1/Z13.39 - 05/18/2021  o ACO-39: Current medications updated and reviewed (, 1159F) - - 05/18/2021  o ACO-18: Negative screen for clinical depression using a standardized tool () - - 05/18/2021   0 point  · Instructions  o Fall prevention methods discussed and a copy of recommendations given to patient.  o Depression Screen completed and scanned into the EMR under the designated folder within the patient's documents.  o Today's PHQ-9 result is _0__  o Patient was educated/instructed on their diagnosis, treatment and medications prior to " discharge from the clinic today.  o Patient instructed to seek medical attention urgently for new or worsening symptoms.  o Call the office with any concerns or questions.  · Disposition  o Follow Up in 2 months.            Electronically Signed by: Artis Arce DO - on May 18, 2021 11:25:45 AM

## 2021-07-15 VITALS
SYSTOLIC BLOOD PRESSURE: 112 MMHG | OXYGEN SATURATION: 98 % | WEIGHT: 133.5 LBS | BODY MASS INDEX: 20.95 KG/M2 | DIASTOLIC BLOOD PRESSURE: 74 MMHG | HEART RATE: 65 BPM | TEMPERATURE: 98.1 F | HEIGHT: 67 IN

## 2021-08-23 ENCOUNTER — OFFICE VISIT (OUTPATIENT)
Dept: FAMILY MEDICINE CLINIC | Facility: CLINIC | Age: 66
End: 2021-08-23

## 2021-08-23 VITALS
TEMPERATURE: 97.7 F | RESPIRATION RATE: 18 BRPM | SYSTOLIC BLOOD PRESSURE: 102 MMHG | DIASTOLIC BLOOD PRESSURE: 58 MMHG | HEART RATE: 66 BPM | BODY MASS INDEX: 20.65 KG/M2 | HEIGHT: 67 IN | OXYGEN SATURATION: 95 % | WEIGHT: 131.6 LBS

## 2021-08-23 DIAGNOSIS — M79.605 LEFT LEG PAIN: Primary | ICD-10-CM

## 2021-08-23 DIAGNOSIS — I83.892 VARICOSE VEINS OF LEFT LEG WITH EDEMA: ICD-10-CM

## 2021-08-23 PROBLEM — E11.9 DIABETES (HCC): Status: ACTIVE | Noted: 2021-08-23

## 2021-08-23 PROBLEM — M19.90 ARTHRITIS: Status: ACTIVE | Noted: 2021-08-23

## 2021-08-23 PROBLEM — Z12.4 CERVICAL CANCER SCREENING: Status: ACTIVE | Noted: 2021-08-23

## 2021-08-23 PROBLEM — K64.9 HEMORRHOIDS: Status: ACTIVE | Noted: 2021-08-23

## 2021-08-23 PROBLEM — M81.0 OSTEOPOROSIS: Status: ACTIVE | Noted: 2019-03-12

## 2021-08-23 PROBLEM — Z12.11 COLON CANCER SCREENING: Status: ACTIVE | Noted: 2021-08-23

## 2021-08-23 PROCEDURE — 99213 OFFICE O/P EST LOW 20 MIN: CPT | Performed by: FAMILY MEDICINE

## 2021-08-23 RX ORDER — ERGOCALCIFEROL 1.25 MG/1
CAPSULE ORAL
COMMUNITY
Start: 2021-07-08 | End: 2021-11-23 | Stop reason: SDUPTHER

## 2021-08-23 RX ORDER — IBUPROFEN 200 MG
CAPSULE ORAL
COMMUNITY
Start: 2021-08-09 | End: 2021-11-23 | Stop reason: SDUPTHER

## 2021-08-23 NOTE — PROGRESS NOTES
"Chief Complaint  Pain (sharp pain in L leg )    Irwin Cool presents to [facility]  History of Present Illness  Presents today for an acute visit complaining of sharp pains in left leg.  Has been going on for several months but seems to be getting worse.  Today she has no pain in her left leg.  She denies any issues with her low back.  She denies any pain with ambulation for long distances.  She again is asymptomatic at this time.  She does have issues with her left knee but states issues not there.  She points to her calf area on the left side where it hurts at times as well as her inner upper thigh on the left side.  Denies any rash or injury to these areas.  Denies any leg cramping or muscle spasms that she is getting.  Objective   Vital Signs:   /58   Pulse 66   Temp 97.7 °F (36.5 °C) (Oral)   Resp 18   Ht 170.2 cm (67\")   Wt 59.7 kg (131 lb 9.6 oz)   SpO2 95%   BMI 20.61 kg/m²     Physical Exam   General: AAO ×3, no acute distress, pleasant  HEENT: Normocephalic, atraumatic  Cardiovascular: Regular rate and rhythm without appreciable murmur  Respiratory: Clear to auscultation bilaterally no RRW  Gastrointestinal: Soft nontender nondistended with bowel sounds present  Vascular: Varicose veins noted sparingly in the left lower extremity below the knee.  She is nontender palpation in the calf area or the upper inner thigh of the left leg.  Her pulses are intact 2+ in the lower extremities bilaterally including dorsalis pedis and posterior tibialis.  extremities: No edema  Neurologic: CN II through XII grossly intact   Psychiatric: Normal mood and affect  Result Review :                 Assessment and Plan    Diagnoses and all orders for this visit:    1. Left leg pain (Primary)  -     Duplex Venous Lower Extremity - Left CAR; Future  -     Ambulatory Referral to Vascular Surgery    2. Varicose veins of left leg with edema  -     Duplex Venous " Lower Extremity - Left CAR; Future  -     Ambulatory Referral to Vascular Surgery    Discussed with her ruling out DVT given duration of symptoms.  I will also set her up with vascular to further evaluate especially as it sounds that she has a symptomatic vein that is giving her issues.  No signs of arterial claudication.    Follow Up   Return in about 3 months (around 11/23/2021) for left lower extremity pain.  Patient was given instructions and counseling regarding her condition or for health maintenance advice. Please see specific information pulled into the AVS if appropriate.

## 2021-08-30 ENCOUNTER — HOSPITAL ENCOUNTER (OUTPATIENT)
Dept: CARDIOLOGY | Facility: HOSPITAL | Age: 66
Discharge: HOME | End: 2021-08-30
Admitting: FAMILY MEDICINE

## 2021-08-30 DIAGNOSIS — I83.892 VARICOSE VEINS OF LEFT LEG WITH EDEMA: ICD-10-CM

## 2021-08-30 DIAGNOSIS — M79.605 LEFT LEG PAIN: ICD-10-CM

## 2021-08-30 LAB
BH CV LOWER VASCULAR LEFT COMMON FEMORAL AUGMENT: NORMAL
BH CV LOWER VASCULAR LEFT COMMON FEMORAL COMPETENT: NORMAL
BH CV LOWER VASCULAR LEFT COMMON FEMORAL COMPRESS: NORMAL
BH CV LOWER VASCULAR LEFT COMMON FEMORAL PHASIC: NORMAL
BH CV LOWER VASCULAR LEFT COMMON FEMORAL SPONT: NORMAL
BH CV LOWER VASCULAR LEFT DISTAL FEMORAL COMPRESS: NORMAL
BH CV LOWER VASCULAR LEFT GREATER SAPH AK COMPRESS: NORMAL
BH CV LOWER VASCULAR LEFT MID FEMORAL AUGMENT: NORMAL
BH CV LOWER VASCULAR LEFT MID FEMORAL COMPETENT: NORMAL
BH CV LOWER VASCULAR LEFT MID FEMORAL COMPRESS: NORMAL
BH CV LOWER VASCULAR LEFT MID FEMORAL PHASIC: NORMAL
BH CV LOWER VASCULAR LEFT MID FEMORAL SPONT: NORMAL
BH CV LOWER VASCULAR LEFT PERONEAL COMPRESS: NORMAL
BH CV LOWER VASCULAR LEFT POPLITEAL AUGMENT: NORMAL
BH CV LOWER VASCULAR LEFT POPLITEAL COMPETENT: NORMAL
BH CV LOWER VASCULAR LEFT POPLITEAL COMPRESS: NORMAL
BH CV LOWER VASCULAR LEFT POPLITEAL PHASIC: NORMAL
BH CV LOWER VASCULAR LEFT POPLITEAL SPONT: NORMAL
BH CV LOWER VASCULAR LEFT POSTERIOR TIBIAL COMPRESS: NORMAL
BH CV LOWER VASCULAR LEFT PROXIMAL FEMORAL COMPRESS: NORMAL
BH CV LOWER VASCULAR LEFT SAPHENOFEMORAL JUNCTION COMPRESS: NORMAL
BH CV LOWER VASCULAR RIGHT COMMON FEMORAL AUGMENT: NORMAL
BH CV LOWER VASCULAR RIGHT COMMON FEMORAL COMPETENT: NORMAL
BH CV LOWER VASCULAR RIGHT COMMON FEMORAL COMPRESS: NORMAL
BH CV LOWER VASCULAR RIGHT COMMON FEMORAL PHASIC: NORMAL
BH CV LOWER VASCULAR RIGHT COMMON FEMORAL SPONT: NORMAL
MAXIMAL PREDICTED HEART RATE: 154 BPM
STRESS TARGET HR: 131 BPM

## 2021-08-30 PROCEDURE — 93971 EXTREMITY STUDY: CPT | Performed by: SURGERY

## 2021-08-30 PROCEDURE — 93971 EXTREMITY STUDY: CPT

## 2021-11-23 ENCOUNTER — TELEPHONE (OUTPATIENT)
Dept: FAMILY MEDICINE CLINIC | Facility: CLINIC | Age: 66
End: 2021-11-23

## 2021-11-23 ENCOUNTER — OFFICE VISIT (OUTPATIENT)
Dept: FAMILY MEDICINE CLINIC | Facility: CLINIC | Age: 66
End: 2021-11-23

## 2021-11-23 VITALS
DIASTOLIC BLOOD PRESSURE: 74 MMHG | HEART RATE: 70 BPM | BODY MASS INDEX: 20.56 KG/M2 | WEIGHT: 131 LBS | HEIGHT: 67 IN | TEMPERATURE: 97.9 F | OXYGEN SATURATION: 98 % | SYSTOLIC BLOOD PRESSURE: 122 MMHG

## 2021-11-23 DIAGNOSIS — E11.65 TYPE 2 DIABETES MELLITUS WITH HYPERGLYCEMIA, WITHOUT LONG-TERM CURRENT USE OF INSULIN (HCC): ICD-10-CM

## 2021-11-23 DIAGNOSIS — R10.84 GENERALIZED ABDOMINAL PAIN: Primary | ICD-10-CM

## 2021-11-23 DIAGNOSIS — E78.00 PURE HYPERCHOLESTEROLEMIA: ICD-10-CM

## 2021-11-23 DIAGNOSIS — Z51.81 MEDICATION MONITORING ENCOUNTER: ICD-10-CM

## 2021-11-23 DIAGNOSIS — R19.7 DIARRHEA, UNSPECIFIED TYPE: ICD-10-CM

## 2021-11-23 DIAGNOSIS — E55.9 VITAMIN D DEFICIENCY: ICD-10-CM

## 2021-11-23 DIAGNOSIS — M81.0 OSTEOPOROSIS, UNSPECIFIED OSTEOPOROSIS TYPE, UNSPECIFIED PATHOLOGICAL FRACTURE PRESENCE: ICD-10-CM

## 2021-11-23 PROCEDURE — 99214 OFFICE O/P EST MOD 30 MIN: CPT | Performed by: FAMILY MEDICINE

## 2021-11-23 RX ORDER — ERGOCALCIFEROL 1.25 MG/1
50000 CAPSULE ORAL
Qty: 13 CAPSULE | Refills: 3 | Status: SHIPPED | OUTPATIENT
Start: 2021-11-23 | End: 2022-02-08 | Stop reason: SDUPTHER

## 2021-11-23 RX ORDER — IBUPROFEN 200 MG
2 CAPSULE ORAL DAILY
Qty: 180 TABLET | Refills: 3 | Status: SHIPPED | OUTPATIENT
Start: 2021-11-23 | End: 2022-02-08 | Stop reason: SDUPTHER

## 2021-11-23 NOTE — TELEPHONE ENCOUNTER
PATIENT TEXTED THAT SHE HAD ARRIVED BUT DID NOT SEE THE REPLY TO COME IN AND SIGN IN. LATE FOR APPOINTMENT - HAD ALREADY MARKED AS A NO SHOW-AS PER DR. RENEE - PUT BACK ON SCHEDULE.

## 2021-11-23 NOTE — PROGRESS NOTES
"Chief Complaint  Abdominal pain  Osteoporosis    Irwin Cool presents to [facility]  History of Present Illness  Patient presents today complaining of abdominal pain.  Symptoms have been ongoing for well over a year.  She states that she has watery diarrhea quite regularly.  She did have a colonoscopy in 2015 that was normal per patient.  She states that she needs a follow-up in 10 years which would be 2025.  This was done on [place].  She describes her abdominal pain as generalized.  She points to her lower abdomen but as well is in her upper abdominal area.  Denies any right upper quadrant pain.  She was worried about her gallbladder but denies any pain after eating.  She reports that she will feel bloated at times.  Denies any radiation of pain into her back or right shoulder blade area.  She does have osteoporosis.  She is not interested in more aggressive management at this time besides taking calcium and vitamin D.  We have discussed risk and benefits previously however she declines for more aggressive measures.  She is taking Metformin for diabetes.  She is due for labs so we discussed getting these done.  She had left prior to labs getting done.  She will return at her convenience to get these done.  She does have hypercholesterolemia.  Discussed repeating her lipid panel.  She is not currently on a statin.  Objective   Vital Signs:   /74   Pulse 70   Temp 97.9 °F (36.6 °C)   Ht 170.2 cm (67\")   Wt 59.4 kg (131 lb)   SpO2 98%   BMI 20.52 kg/m²     Physical Exam   General: AAO ×3, no acute distress, pleasant  HEENT: Normocephalic, atraumatic  Cardiovascular: Regular rate and rhythm without appreciable murmur  Respiratory: Clear to auscultation bilaterally no RRW  Generalized tenderness to palpation in the right and left lower quadrant.  She also has some pain in the periumbilical area.  No bloating appreciated.  There is no guarding or " rigidity.  No right upper quadrant tenderness to palpation.  extremities: No edema  Neurologic: CN II through XII grossly intact   Psychiatric: Normal mood and affect  Result Review :                 Assessment and Plan    Diagnoses and all orders for this visit:    1. Generalized abdominal pain (Primary)  -     CT Abdomen Pelvis With Contrast; Future    2. Osteoporosis, unspecified osteoporosis type, unspecified pathological fracture presence    3. Pure hypercholesterolemia  -     Lipid Panel    4. Type 2 diabetes mellitus with hyperglycemia, without long-term current use of insulin (HCC)  -     CBC & Differential  -     Comprehensive Metabolic Panel  -     Hemoglobin A1c    5. Vitamin D deficiency  -     Vitamin D 25 Hydroxy    6. Medication monitoring encounter  -     CBC & Differential  -     Comprehensive Metabolic Panel  -     Hemoglobin A1c  -     Lipid Panel  -     Vitamin D 25 Hydroxy    7. Diarrhea, unspecified type  -     CT Abdomen Pelvis With Contrast; Future    Other orders  -     vitamin D (ERGOCALCIFEROL) 1.25 MG (88782 UT) capsule capsule; Take 1 capsule by mouth Every 7 (Seven) Days.  Dispense: 13 capsule; Refill: 3  -     metFORMIN (GLUCOPHAGE) 500 MG tablet; Take 1 tablet by mouth Daily With Breakfast.  Dispense: 90 tablet; Refill: 3  -     calcium carbonate (OS-SARAH) 1250 (500 Ca) MG tablet; Take 2 tablets by mouth Daily.  Dispense: 180 tablet; Refill: 3    Unclear source of abdominal pain that has been chronic.  I am concerned that Metformin is contributing to her symptoms of diarrhea which may be the source of her bloating and discomfort/pain.  Discussed doing a trial off of Metformin.  I will also order a CT scan for further evaluation.  Further recommendations to follow once results return.  Discussed seeing patient back in 1 month or sooner if needed.  We will continue with calcium and vitamin D supplementation for osteoporosis.  She declines more aggressive treatment.    Follow Up   Return  in about 1 month (around 12/23/2021) for abdominal pain.  Patient was given instructions and counseling regarding her condition or for health maintenance advice. Please see specific information pulled into the AVS if appropriate.

## 2021-12-17 ENCOUNTER — HOSPITAL ENCOUNTER (OUTPATIENT)
Dept: CT IMAGING | Facility: HOSPITAL | Age: 66
Discharge: HOME | End: 2021-12-17
Admitting: FAMILY MEDICINE

## 2021-12-17 DIAGNOSIS — R10.84 GENERALIZED ABDOMINAL PAIN: ICD-10-CM

## 2021-12-17 DIAGNOSIS — K57.92 DIVERTICULITIS: Primary | ICD-10-CM

## 2021-12-17 DIAGNOSIS — R19.00 PELVIC MASS: ICD-10-CM

## 2021-12-17 DIAGNOSIS — R19.7 DIARRHEA, UNSPECIFIED TYPE: ICD-10-CM

## 2021-12-17 LAB — CREAT BLDA-MCNC: 0.6 MG/DL

## 2021-12-17 PROCEDURE — 82565 ASSAY OF CREATININE: CPT

## 2021-12-17 PROCEDURE — 0 IOPAMIDOL PER 1 ML: Performed by: FAMILY MEDICINE

## 2021-12-17 PROCEDURE — 74177 CT ABD & PELVIS W/CONTRAST: CPT

## 2021-12-17 RX ORDER — LEVOFLOXACIN 750 MG/1
750 TABLET ORAL DAILY
Qty: 10 TABLET | Refills: 0 | Status: SHIPPED | OUTPATIENT
Start: 2021-12-17 | End: 2021-12-27

## 2021-12-17 RX ORDER — METRONIDAZOLE 500 MG/1
500 TABLET ORAL 3 TIMES DAILY
Qty: 30 TABLET | Refills: 0 | Status: SHIPPED | OUTPATIENT
Start: 2021-12-17 | End: 2021-12-27

## 2021-12-17 RX ADMIN — IOPAMIDOL 100 ML: 755 INJECTION, SOLUTION INTRAVENOUS at 10:40

## 2021-12-29 ENCOUNTER — LAB (OUTPATIENT)
Dept: FAMILY MEDICINE CLINIC | Facility: CLINIC | Age: 66
End: 2021-12-29

## 2022-01-05 ENCOUNTER — OFFICE VISIT (OUTPATIENT)
Dept: FAMILY MEDICINE CLINIC | Facility: CLINIC | Age: 67
End: 2022-01-05

## 2022-01-05 VITALS
HEIGHT: 67 IN | OXYGEN SATURATION: 99 % | DIASTOLIC BLOOD PRESSURE: 58 MMHG | WEIGHT: 132.3 LBS | BODY MASS INDEX: 20.76 KG/M2 | TEMPERATURE: 98.1 F | HEART RATE: 72 BPM | SYSTOLIC BLOOD PRESSURE: 118 MMHG

## 2022-01-05 DIAGNOSIS — E11.65 TYPE 2 DIABETES MELLITUS WITH HYPERGLYCEMIA, WITHOUT LONG-TERM CURRENT USE OF INSULIN: ICD-10-CM

## 2022-01-05 DIAGNOSIS — R93.3 ABNORMAL CT SCAN, SIGMOID COLON: ICD-10-CM

## 2022-01-05 DIAGNOSIS — K57.90 DIVERTICULOSIS: ICD-10-CM

## 2022-01-05 DIAGNOSIS — N20.0 RENAL STONES: ICD-10-CM

## 2022-01-05 DIAGNOSIS — K57.92 DIVERTICULITIS: Primary | ICD-10-CM

## 2022-01-05 DIAGNOSIS — K80.20 GALLSTONES: ICD-10-CM

## 2022-01-05 LAB
25(OH)D3 SERPL-MCNC: 47.4 NG/ML
ALBUMIN SERPL-MCNC: 5 G/DL (ref 3.5–5.2)
ALBUMIN/GLOB SERPL: 2.1 G/DL
ALP SERPL-CCNC: 84 U/L (ref 39–117)
ALT SERPL W P-5'-P-CCNC: 13 U/L (ref 1–33)
ANION GAP SERPL CALCULATED.3IONS-SCNC: 8.1 MMOL/L (ref 5–15)
AST SERPL-CCNC: 17 U/L (ref 1–32)
BASOPHILS # BLD AUTO: 0.03 10*3/MM3 (ref 0–0.2)
BASOPHILS NFR BLD AUTO: 0.6 % (ref 0–1.5)
BILIRUB SERPL-MCNC: 0.4 MG/DL (ref 0–1.2)
BUN SERPL-MCNC: 11 MG/DL (ref 8–23)
BUN/CREAT SERPL: 17.2 (ref 7–25)
CALCIUM SPEC-SCNC: 9.6 MG/DL (ref 8.6–10.5)
CHLORIDE SERPL-SCNC: 103 MMOL/L (ref 98–107)
CHOLEST SERPL-MCNC: 212 MG/DL (ref 0–200)
CO2 SERPL-SCNC: 28.9 MMOL/L (ref 22–29)
CREAT SERPL-MCNC: 0.64 MG/DL (ref 0.57–1)
DEPRECATED RDW RBC AUTO: 38.7 FL (ref 37–54)
EOSINOPHIL # BLD AUTO: 0.03 10*3/MM3 (ref 0–0.4)
EOSINOPHIL NFR BLD AUTO: 0.6 % (ref 0.3–6.2)
ERYTHROCYTE [DISTWIDTH] IN BLOOD BY AUTOMATED COUNT: 11.8 % (ref 12.3–15.4)
GFR SERPL CREATININE-BSD FRML MDRD: 113 ML/MIN/1.73
GFR SERPL CREATININE-BSD FRML MDRD: 93 ML/MIN/1.73
GLOBULIN UR ELPH-MCNC: 2.4 GM/DL
GLUCOSE SERPL-MCNC: 94 MG/DL (ref 65–99)
HBA1C MFR BLD: 6.21 % (ref 4.8–5.6)
HCT VFR BLD AUTO: 43.4 % (ref 34–46.6)
HDLC SERPL-MCNC: 77 MG/DL (ref 40–60)
HGB BLD-MCNC: 14.3 G/DL (ref 12–15.9)
IMM GRANULOCYTES # BLD AUTO: 0.01 10*3/MM3 (ref 0–0.05)
IMM GRANULOCYTES NFR BLD AUTO: 0.2 % (ref 0–0.5)
LDLC SERPL CALC-MCNC: 117 MG/DL (ref 0–100)
LDLC/HDLC SERPL: 1.49 {RATIO}
LYMPHOCYTES # BLD AUTO: 1.95 10*3/MM3 (ref 0.7–3.1)
LYMPHOCYTES NFR BLD AUTO: 36 % (ref 19.6–45.3)
MCH RBC QN AUTO: 29.9 PG (ref 26.6–33)
MCHC RBC AUTO-ENTMCNC: 32.9 G/DL (ref 31.5–35.7)
MCV RBC AUTO: 90.6 FL (ref 79–97)
MONOCYTES # BLD AUTO: 0.37 10*3/MM3 (ref 0.1–0.9)
MONOCYTES NFR BLD AUTO: 6.8 % (ref 5–12)
NEUTROPHILS NFR BLD AUTO: 3.03 10*3/MM3 (ref 1.7–7)
NEUTROPHILS NFR BLD AUTO: 55.8 % (ref 42.7–76)
NRBC BLD AUTO-RTO: 0 /100 WBC (ref 0–0.2)
PLATELET # BLD AUTO: 236 10*3/MM3 (ref 140–450)
PMV BLD AUTO: 11.2 FL (ref 6–12)
POTASSIUM SERPL-SCNC: 4.3 MMOL/L (ref 3.5–5.2)
PROT SERPL-MCNC: 7.4 G/DL (ref 6–8.5)
RBC # BLD AUTO: 4.79 10*6/MM3 (ref 3.77–5.28)
SODIUM SERPL-SCNC: 140 MMOL/L (ref 136–145)
TRIGL SERPL-MCNC: 102 MG/DL (ref 0–150)
VLDLC SERPL-MCNC: 18 MG/DL (ref 5–40)
WBC NRBC COR # BLD: 5.42 10*3/MM3 (ref 3.4–10.8)

## 2022-01-05 PROCEDURE — 80053 COMPREHEN METABOLIC PANEL: CPT | Performed by: FAMILY MEDICINE

## 2022-01-05 PROCEDURE — 99214 OFFICE O/P EST MOD 30 MIN: CPT | Performed by: FAMILY MEDICINE

## 2022-01-05 PROCEDURE — 80061 LIPID PANEL: CPT | Performed by: FAMILY MEDICINE

## 2022-01-05 PROCEDURE — 83036 HEMOGLOBIN GLYCOSYLATED A1C: CPT | Performed by: FAMILY MEDICINE

## 2022-01-05 PROCEDURE — 82306 VITAMIN D 25 HYDROXY: CPT | Performed by: FAMILY MEDICINE

## 2022-01-05 PROCEDURE — 85025 COMPLETE CBC W/AUTO DIFF WBC: CPT | Performed by: FAMILY MEDICINE

## 2022-01-05 RX ORDER — AMOXICILLIN AND CLAVULANATE POTASSIUM 875; 125 MG/1; MG/1
1 TABLET, FILM COATED ORAL 3 TIMES DAILY
Qty: 21 TABLET | Refills: 0 | Status: SHIPPED | OUTPATIENT
Start: 2022-01-05 | End: 2022-01-12

## 2022-01-05 NOTE — PROGRESS NOTES
Chief Complaint  Diverticulitis  Diabetes    Irwin Cool presents to [facility]  History of Present Illness  Patient presents today to follow-up for diverticulitis/abdominal pain.  I last saw her for this issue on 11/23/2021.  At that time I had ordered a CT of her abdomen pelvis which showed thickening of the sigmoid colon which may represent chronic diverticulitis.  This is per radiology report.  She also had an area of fullness in the posterior right hemipelvis that may be adnexal or exophytic from the uterus with a pelvic ultrasound being recommended for better characterization.  She will have his ultrasound coming up soon.  When I contacted patient with results on 12/17/2021 I did place her on levofloxacin and metronidazole.  She reports she had an odd numb sensation in her left arm that went away after she took her self off the antibiotics that I gave her.  She admits that she only took 2 days.  She admits that her abdominal pain has improved.  I also had her stop taking the Metformin although she admittedly is still taking it every other day.  She is currently on 500 mg every other day.  She admits abdominal pain has improved.  She denies any issues with stooling.  Reports that her diarrhea has resolved.  She denies any fever or chills.  Overall she is feeling better.  She did have a colonoscopy done in 2015 and was told that she would need follow-up in 10 years which will be 2025.  I discussed with her the given diagnosis of diverticulitis she will need another follow-up colonoscopy done sooner rather than later.  Since she had an adequate antibiotic coverage I will place her on Augmentin to take 3 times a day for the next 7 days.  Again, she is feeling better however I am concerned about inadequate antibiotic coverage.  Her CT also showed gallstones but she denies that this is causing her any issues.  Denies any biliary colic symptoms.  Denies any  "radiation of pain into her back or right shoulder.  She is due for labs today.  Discussed getting these done.  She also had some renal stones noted on her CT abdomen pelvis.  There are two 2 mm nonobstructing calculi, one in the right mid kidney and the other one posterior left mid kidney.  Objective   Vital Signs:   /58   Pulse 72   Temp 98.1 °F (36.7 °C)   Ht 170.2 cm (67\")   Wt 60 kg (132 lb 4.8 oz)   SpO2 99%   BMI 20.72 kg/m²     Physical Exam   General: AAO ×3, no acute distress, pleasant  HEENT: Normocephalic, atraumatic  Cardiovascular: Regular rate and rhythm without appreciable murmur  Respiratory: Clear to auscultation bilaterally no RRW  Gastrointestinal: Abdomen is mainly soft but there is some left lower quadrant tenderness to deep palpation.  Bowel sounds are present.  Extremities: No edema  Neurologic: CN II through XII grossly intact   Psychiatric: Normal mood and affect  Result Review :                 Assessment and Plan    Diagnoses and all orders for this visit:    1. Diverticulitis, chronic (Primary)  -     Ambulatory Referral For Screening Colonoscopy    2. Type 2 diabetes mellitus with hyperglycemia, without long-term current use of insulin ([facility])    3. Diverticulosis  -     Ambulatory Referral For Screening Colonoscopy    4. Gallstones    5. Abnormal CT scan, sigmoid colon    6. Renal stones    Other orders  -     amoxicillin-clavulanate (Augmentin) 875-125 MG per tablet; Take 1 tablet by mouth 3 (Three) Times a Day for 7 days.  Dispense: 21 tablet; Refill: 0    I discussed with patient need to complete her antibiotic therapy.  I will switch her to Augmentin for coverage for diverticulitis.  Discussed referral for colonoscopy.  She will have further evaluation of the right hemipelvis area given the 3.1 cm fullness that was seen.  Further recommendations to follow depending on clinical course.  I will see her back in 1 month or sooner if needed.  Labs to be done today.  She is to " continue holding Metformin as I am concerned that this may have been causing some of her symptoms as well.    Follow Up   Return in about 1 month (around 2/5/2022) for diverticulitis.  Patient was given instructions and counseling regarding her condition or for health maintenance advice. Please see specific information pulled into the AVS if appropriate.

## 2022-01-21 ENCOUNTER — HOSPITAL ENCOUNTER (OUTPATIENT)
Dept: ULTRASOUND IMAGING | Facility: HOSPITAL | Age: 67
Discharge: HOME | End: 2022-01-21
Admitting: FAMILY MEDICINE

## 2022-01-21 DIAGNOSIS — R19.00 PELVIC MASS: ICD-10-CM

## 2022-01-21 PROCEDURE — 76856 US EXAM PELVIC COMPLETE: CPT

## 2022-01-24 DIAGNOSIS — R19.00 PELVIC MASS: Primary | ICD-10-CM

## 2022-01-24 DIAGNOSIS — D21.9 FIBROID: ICD-10-CM

## 2022-02-04 ENCOUNTER — APPOINTMENT (OUTPATIENT)
Dept: MRI IMAGING | Facility: HOSPITAL | Age: 67
End: 2022-02-04

## 2022-02-08 ENCOUNTER — OFFICE VISIT (OUTPATIENT)
Dept: FAMILY MEDICINE CLINIC | Facility: CLINIC | Age: 67
End: 2022-02-08

## 2022-02-08 VITALS
DIASTOLIC BLOOD PRESSURE: 60 MMHG | HEART RATE: 71 BPM | BODY MASS INDEX: 21.31 KG/M2 | TEMPERATURE: 98.1 F | SYSTOLIC BLOOD PRESSURE: 102 MMHG | OXYGEN SATURATION: 98 % | HEIGHT: 67 IN | WEIGHT: 135.8 LBS

## 2022-02-08 DIAGNOSIS — E55.9 VITAMIN D DEFICIENCY: ICD-10-CM

## 2022-02-08 DIAGNOSIS — K57.90 DIVERTICULOSIS: ICD-10-CM

## 2022-02-08 DIAGNOSIS — E11.65 TYPE 2 DIABETES MELLITUS WITH HYPERGLYCEMIA, WITHOUT LONG-TERM CURRENT USE OF INSULIN: Primary | ICD-10-CM

## 2022-02-08 DIAGNOSIS — Z51.81 MEDICATION MONITORING ENCOUNTER: ICD-10-CM

## 2022-02-08 DIAGNOSIS — M79.672 LEFT FOOT PAIN: ICD-10-CM

## 2022-02-08 DIAGNOSIS — K57.92 DIVERTICULITIS: ICD-10-CM

## 2022-02-08 DIAGNOSIS — E78.5 HYPERLIPIDEMIA, UNSPECIFIED HYPERLIPIDEMIA TYPE: ICD-10-CM

## 2022-02-08 PROCEDURE — 99214 OFFICE O/P EST MOD 30 MIN: CPT | Performed by: FAMILY MEDICINE

## 2022-02-08 RX ORDER — ERGOCALCIFEROL 1.25 MG/1
50000 CAPSULE ORAL
Qty: 13 CAPSULE | Refills: 3 | Status: SHIPPED | OUTPATIENT
Start: 2022-02-08 | End: 2022-08-08 | Stop reason: SDUPTHER

## 2022-02-08 RX ORDER — IBUPROFEN 200 MG
2 CAPSULE ORAL DAILY
Qty: 180 TABLET | Refills: 3 | Status: SHIPPED | OUTPATIENT
Start: 2022-02-08

## 2022-02-08 RX ORDER — LIDOCAINE 50 MG/G
1 PATCH TOPICAL EVERY 24 HOURS
Qty: 30 EACH | Refills: 1 | Status: SHIPPED | OUTPATIENT
Start: 2022-02-08 | End: 2022-08-08 | Stop reason: SDUPTHER

## 2022-02-08 NOTE — PROGRESS NOTES
"Chief Complaint  Refills  Left foot pain- requesting lidocaine patch  Vitamin d refill  Discuss imaging    Irwin          Trish Cool presents to [facility]  History of Present Illness  Patient presents today requesting refill of vitamin D as well as calcium supplementation.  We took a break from taking Metformin due to GI issues she was having.  She reports that her abdominal pain has resolved since last time I saw her.  We did treat her for diverticulitis.  She had some side effects including an odd numb sensation of the left arm that went away after she took her self off of the antibiotics that I gave her which included levofloxacin and metronidazole.  I placed her on Augmentin to complete her treatment.  She reports feeling better now.  Denies any more abdominal pain.  We discussed starting back on Metformin again.  On a CT of the abdomen pelvis she had an area of fullness in the right posterior hemipelvis.  I got an ultrasound which showed an exophytic 4.3 x 3.4 cm mass adjacent to the uterine fundus likely pedunculated exophytic fibroid that could be confirmed with a pelvic MRI.  This is per radiology report.  MRI of the pelvis has been ordered.  She is post to get it done but had to reschedule due to weather.  It will be done now on 2/22/2022.  Further recommendations to follow once results return.  She is complaining of left foot pain since she injured her foot while helping her son move back in December.  She reports she is able to walk without any difficulties but has some pain.  She is requesting a lidocaine patch.  She reports that overall it is better.  Objective   Vital Signs:   /60   Pulse 71   Temp 98.1 °F (36.7 °C)   Ht 170.2 cm (67\")   Wt 61.6 kg (135 lb 12.8 oz)   SpO2 98%   BMI 21.27 kg/m²     Physical Exam   General: AAO ×3, no acute distress, pleasant  HEENT: Normocephalic, atraumatic  Cardiovascular: Regular rate and rhythm without " appreciable murmur  Respiratory: Clear to auscultation bilaterally no RRW  Gastrointestinal: Soft nontender nondistended with bowel sounds present  Musculoskeletal: Patient able to ambulate without difficulty  extremities: No edema  Neurologic: CN II through XII grossly intact   Psychiatric: Normal mood and affect  Result Review :                 Assessment and Plan    Diagnoses and all orders for this visit:    1. Diverticulosis (Primary)    2. Diverticulitis, chronic    3. Type 2 diabetes mellitus with hyperglycemia, without long-term current use of insulin (HCC)  -     CBC & Differential; Future  -     Comprehensive Metabolic Panel; Future  -     Hemoglobin A1c; Future  -     Lipid Panel; Future  -     Microalbumin / Creatinine Urine Ratio - Urine, Clean Catch; Future    4. Left foot pain  -     lidocaine (LIDODERM) 5 %; Place 1 patch on the skin as directed by provider Daily. Remove & Discard patch within 12 hours or as directed by MD  Dispense: 30 each; Refill: 1    5. Vitamin D deficiency  -     Vitamin D 25 Hydroxy; Future    6. Hyperlipidemia, unspecified hyperlipidemia type  -     Lipid Panel; Future    7. Medication monitoring encounter  -     CBC & Differential; Future  -     Comprehensive Metabolic Panel; Future  -     Hemoglobin A1c; Future  -     Lipid Panel; Future  -     Microalbumin / Creatinine Urine Ratio - Urine, Clean Catch; Future  -     Vitamin D 25 Hydroxy; Future    Other orders  -     calcium carbonate (OS-SARAH) 1250 (500 Ca) MG tablet; Take 2 tablets by mouth Daily.  Dispense: 180 tablet; Refill: 3  -     vitamin D (ERGOCALCIFEROL) 1.25 MG (20185 UT) capsule capsule; Take 1 capsule by mouth Every 7 (Seven) Days.  Dispense: 13 capsule; Refill: 3    I discussed with patient getting into see GI to have a colonoscopy done again given her recent finding of diverticulosis and chronic diverticulitis.  She is supposed to see GI on 2/3/2022 but will have to reschedule.  I encouraged her to do so.   She reports that she has a lot going on right now but may consider doing it next month.  Discussed having labs repeated when she returns for follow-up.  Discussed continue with Metformin again for treatment of type 2 diabetes.  She was noted previously to have a slightly elevated LDL at 117.  I discussed with her lifestyle modification including diet and exercise.  I will give her a lidocaine patch for her left foot.  If symptoms worsen she is instructed to call or return.    Follow Up   Return in about 6 months (around 8/8/2022) for diabetes.  Patient was given instructions and counseling regarding her condition or for health maintenance advice. Please see specific information pulled into the AVS if appropriate.

## 2022-02-22 ENCOUNTER — HOSPITAL ENCOUNTER (OUTPATIENT)
Dept: MRI IMAGING | Facility: HOSPITAL | Age: 67
Discharge: HOME | End: 2022-02-22
Admitting: FAMILY MEDICINE

## 2022-02-22 DIAGNOSIS — R19.00 PELVIC MASS: ICD-10-CM

## 2022-02-22 DIAGNOSIS — D21.9 FIBROID: ICD-10-CM

## 2022-02-22 LAB — CREAT BLDA-MCNC: 0.7 MG/DL

## 2022-02-22 PROCEDURE — 0 GADOBENATE DIMEGLUMINE 529 MG/ML SOLUTION: Performed by: FAMILY MEDICINE

## 2022-02-22 PROCEDURE — 82565 ASSAY OF CREATININE: CPT

## 2022-02-22 PROCEDURE — 72196 MRI PELVIS W/DYE: CPT

## 2022-02-22 PROCEDURE — A9577 INJ MULTIHANCE: HCPCS | Performed by: FAMILY MEDICINE

## 2022-02-22 RX ADMIN — GADOBENATE DIMEGLUMINE 13 ML: 529 INJECTION, SOLUTION INTRAVENOUS at 10:47

## 2022-08-08 ENCOUNTER — OFFICE VISIT (OUTPATIENT)
Dept: FAMILY MEDICINE CLINIC | Facility: CLINIC | Age: 67
End: 2022-08-08

## 2022-08-08 VITALS
DIASTOLIC BLOOD PRESSURE: 62 MMHG | TEMPERATURE: 97.9 F | HEIGHT: 67 IN | WEIGHT: 134.1 LBS | OXYGEN SATURATION: 99 % | BODY MASS INDEX: 21.05 KG/M2 | HEART RATE: 69 BPM | SYSTOLIC BLOOD PRESSURE: 102 MMHG

## 2022-08-08 DIAGNOSIS — Z12.11 SCREENING FOR COLON CANCER: ICD-10-CM

## 2022-08-08 DIAGNOSIS — D21.9 FIBROID: ICD-10-CM

## 2022-08-08 DIAGNOSIS — J30.9 ALLERGIC RHINITIS, UNSPECIFIED SEASONALITY, UNSPECIFIED TRIGGER: ICD-10-CM

## 2022-08-08 DIAGNOSIS — E78.5 HYPERLIPIDEMIA, UNSPECIFIED HYPERLIPIDEMIA TYPE: ICD-10-CM

## 2022-08-08 DIAGNOSIS — E55.9 VITAMIN D DEFICIENCY: ICD-10-CM

## 2022-08-08 DIAGNOSIS — M79.672 LEFT FOOT PAIN: ICD-10-CM

## 2022-08-08 DIAGNOSIS — Z51.81 MEDICATION MONITORING ENCOUNTER: ICD-10-CM

## 2022-08-08 DIAGNOSIS — E11.65 TYPE 2 DIABETES MELLITUS WITH HYPERGLYCEMIA, WITHOUT LONG-TERM CURRENT USE OF INSULIN: Primary | ICD-10-CM

## 2022-08-08 LAB
25(OH)D3 SERPL-MCNC: 56.1 NG/ML (ref 30–100)
ALBUMIN SERPL-MCNC: 4.6 G/DL (ref 3.5–5.2)
ALBUMIN UR-MCNC: <1.2 MG/DL
ALBUMIN/GLOB SERPL: 1.8 G/DL
ALP SERPL-CCNC: 72 U/L (ref 39–117)
ALT SERPL W P-5'-P-CCNC: 18 U/L (ref 1–33)
ANION GAP SERPL CALCULATED.3IONS-SCNC: 10 MMOL/L (ref 5–15)
AST SERPL-CCNC: 25 U/L (ref 1–32)
BILIRUB SERPL-MCNC: 0.4 MG/DL (ref 0–1.2)
BUN SERPL-MCNC: 11 MG/DL (ref 8–23)
BUN/CREAT SERPL: 16.9 (ref 7–25)
CALCIUM SPEC-SCNC: 9.5 MG/DL (ref 8.6–10.5)
CHLORIDE SERPL-SCNC: 104 MMOL/L (ref 98–107)
CHOLEST SERPL-MCNC: 240 MG/DL (ref 0–200)
CO2 SERPL-SCNC: 26 MMOL/L (ref 22–29)
CREAT SERPL-MCNC: 0.65 MG/DL (ref 0.57–1)
CREAT UR-MCNC: 14.7 MG/DL
EGFRCR SERPLBLD CKD-EPI 2021: 96.6 ML/MIN/1.73
GLOBULIN UR ELPH-MCNC: 2.5 GM/DL
GLUCOSE SERPL-MCNC: 96 MG/DL (ref 65–99)
HBA1C MFR BLD: 6.5 % (ref 4.8–5.6)
HDLC SERPL-MCNC: 68 MG/DL (ref 40–60)
LDLC SERPL CALC-MCNC: 142 MG/DL (ref 0–100)
LDLC/HDLC SERPL: 2.03 {RATIO}
MICROALBUMIN/CREAT UR: NORMAL MG/G{CREAT}
POTASSIUM SERPL-SCNC: 4.3 MMOL/L (ref 3.5–5.2)
PROT SERPL-MCNC: 7.1 G/DL (ref 6–8.5)
SODIUM SERPL-SCNC: 140 MMOL/L (ref 136–145)
TRIGL SERPL-MCNC: 171 MG/DL (ref 0–150)
VLDLC SERPL-MCNC: 30 MG/DL (ref 5–40)

## 2022-08-08 PROCEDURE — 82306 VITAMIN D 25 HYDROXY: CPT | Performed by: FAMILY MEDICINE

## 2022-08-08 PROCEDURE — 80053 COMPREHEN METABOLIC PANEL: CPT | Performed by: FAMILY MEDICINE

## 2022-08-08 PROCEDURE — 99214 OFFICE O/P EST MOD 30 MIN: CPT | Performed by: FAMILY MEDICINE

## 2022-08-08 PROCEDURE — 80061 LIPID PANEL: CPT | Performed by: FAMILY MEDICINE

## 2022-08-08 PROCEDURE — 36415 COLL VENOUS BLD VENIPUNCTURE: CPT | Performed by: FAMILY MEDICINE

## 2022-08-08 PROCEDURE — 83036 HEMOGLOBIN GLYCOSYLATED A1C: CPT | Performed by: FAMILY MEDICINE

## 2022-08-08 PROCEDURE — 82043 UR ALBUMIN QUANTITATIVE: CPT | Performed by: FAMILY MEDICINE

## 2022-08-08 PROCEDURE — 82570 ASSAY OF URINE CREATININE: CPT | Performed by: FAMILY MEDICINE

## 2022-08-08 RX ORDER — LORATADINE 10 MG/1
10 TABLET ORAL DAILY
Qty: 90 TABLET | Refills: 1 | Status: SHIPPED | OUTPATIENT
Start: 2022-08-08

## 2022-08-08 RX ORDER — ERGOCALCIFEROL 1.25 MG/1
50000 CAPSULE ORAL
Qty: 13 CAPSULE | Refills: 3 | Status: SHIPPED | OUTPATIENT
Start: 2022-08-08

## 2022-08-08 RX ORDER — LIDOCAINE 50 MG/G
1 PATCH TOPICAL EVERY 24 HOURS
Qty: 90 EACH | Refills: 1 | Status: SHIPPED | OUTPATIENT
Start: 2022-08-08

## 2022-08-08 NOTE — PROGRESS NOTES
"Chief Complaint  Diabetes  Vitamin d defiency    Subjective        Trish Cool presents to [facility]  History of Present Illness  Patient presents today to follow-up for type 2 diabetes.  She is currently taking metformin 500 mg daily.  Her last A1c was 6.21%.  We discussed having labs repeated today.  Previously noted to have a LDL of 117.  She is not currently on a statin.  She does take vitamin D and calcium.  She is requesting medications to be refilled.  Last time I saw her we had followed up for an MRI of the pelvis which showed a 3.5 cm solid-appearing mass in the right adnexal region favored to represent a leiomyoma.  There is also dilation of the gonadal and parametrial veins left greater than right which can be seen with pelvic venous congestion syndrome.  She continues to remain asymptomatic.  I previously offered referral to OB/GYN but she wanted to hold off at that time.  She continues to remain asymptomatic.  She does have issues with allergies periodically and reports that this year has been worse.  She previously took Benadryl to help out with her symptoms.  Objective   Vital Signs:  /62   Pulse 69   Temp 97.9 °F (36.6 °C)   Ht 170.2 cm (67\")   Wt 60.8 kg (134 lb 1.6 oz)   SpO2 99%   BMI 21.00 kg/m²   Estimated body mass index is 21 kg/m² as calculated from the following:    Height as of this encounter: 170.2 cm (67\").    Weight as of this encounter: 60.8 kg (134 lb 1.6 oz).    BMI is within normal parameters. No other follow-up for BMI required.      Physical Exam   General: AAO ×3, no acute distress, pleasant  HEENT: Normocephalic, atraumatic, no discharge in the eyes, no discharge from the nose, no oropharyngeal erythema or exudates, and TMs intact bilaterally with no erythema, no cervical tenderness or lymphadenopathy  Cardiovascular: Regular rate and rhythm without appreciable murmur  Respiratory: Clear to auscultation bilaterally no " RRW  Gastrointestinal: Soft nontender nondistended with bowel sounds present  extremities: No edema  Neurologic: CN II through XII grossly intact   Psychiatric: Normal mood and affect  Result Review :                Assessment and Plan   Diagnoses and all orders for this visit:    1. Type 2 diabetes mellitus with hyperglycemia, without long-term current use of insulin (HCC) (Primary)  -     Comprehensive Metabolic Panel  -     Hemoglobin A1c  -     Lipid Panel  -     Microalbumin / Creatinine Urine Ratio - Urine, Clean Catch    2. Left foot pain  -     lidocaine (LIDODERM) 5 %; Place 1 patch on the skin as directed by provider Daily. Remove & Discard patch within 12 hours or as directed by provider  Dispense: 90 each; Refill: 1    3. Screening for colon cancer  -     Ambulatory Referral For Screening Colonoscopy    4. Vitamin D deficiency  -     Vitamin D 25 Hydroxy    5. Medication monitoring encounter  -     Comprehensive Metabolic Panel  -     Hemoglobin A1c  -     Lipid Panel  -     Microalbumin / Creatinine Urine Ratio - Urine, Clean Catch  -     Vitamin D 25 Hydroxy    6. Allergic rhinitis, unspecified seasonality, unspecified trigger    7. Hyperlipidemia, unspecified hyperlipidemia type  -     Lipid Panel    8. Fibroid    Other orders  -     metFORMIN (GLUCOPHAGE) 500 MG tablet; Take 1 tablet by mouth Daily With Breakfast.  Dispense: 90 tablet; Refill: 3  -     vitamin D (ERGOCALCIFEROL) 1.25 MG (51193 UT) capsule capsule; Take 1 capsule by mouth Every 7 (Seven) Days.  Dispense: 13 capsule; Refill: 3  -     loratadine (Claritin) 10 MG tablet; Take 1 tablet by mouth Daily.  Dispense: 90 tablet; Refill: 1    I discussed with patient getting labs done today.  I will also send in Claritin for allergic rhinitis.  Plan to see patient back in 6 months or sooner if needed.  We discussed continuing current calcium vitamin D supplementation.         Follow Up   Return in about 6 months (around 2/8/2023) for  diabetes.  Patient was given instructions and counseling regarding her condition or for health maintenance advice. Please see specific information pulled into the AVS if appropriate.

## 2022-08-09 RX ORDER — ATORVASTATIN CALCIUM 10 MG/1
10 TABLET, FILM COATED ORAL NIGHTLY
Qty: 90 TABLET | Refills: 1 | Status: SHIPPED | OUTPATIENT
Start: 2022-08-09

## 2022-12-08 ENCOUNTER — CLINICAL SUPPORT (OUTPATIENT)
Dept: FAMILY MEDICINE CLINIC | Facility: CLINIC | Age: 67
End: 2022-12-08

## 2022-12-08 DIAGNOSIS — Z23 NEED FOR INFLUENZA VACCINATION: Primary | ICD-10-CM

## 2022-12-08 PROCEDURE — G0008 ADMIN INFLUENZA VIRUS VAC: HCPCS | Performed by: FAMILY MEDICINE

## 2022-12-08 PROCEDURE — 90662 IIV NO PRSV INCREASED AG IM: CPT | Performed by: FAMILY MEDICINE

## 2022-12-14 ENCOUNTER — CLINICAL SUPPORT (OUTPATIENT)
Dept: GASTROENTEROLOGY | Facility: CLINIC | Age: 67
End: 2022-12-14

## 2022-12-14 ENCOUNTER — PREP FOR SURGERY (OUTPATIENT)
Dept: OTHER | Facility: HOSPITAL | Age: 67
End: 2022-12-14

## 2022-12-14 DIAGNOSIS — Z12.11 COLON CANCER SCREENING: Primary | ICD-10-CM

## 2022-12-14 RX ORDER — PEG-3350, SODIUM SULFATE, SODIUM CHLORIDE, POTASSIUM CHLORIDE, SODIUM ASCORBATE AND ASCORBIC ACID 7.5-2.691G
1000 KIT ORAL EVERY 12 HOURS
Qty: 1000 ML | Refills: 0 | Status: SHIPPED | OUTPATIENT
Start: 2022-12-14 | End: 2023-04-05 | Stop reason: SDUPTHER

## 2022-12-14 NOTE — PROGRESS NOTES
Trish Cool  REASON FOR CALL Screening for colonoscopy   SENT IN PREP Moviprep   Patient declines taking a blood thinner at this time  Patient declines being treated by a cardiologist at this time   Patient declines being treated by a pulmonologist at this time     Past Medical History:   Diagnosis Date   • Arthritis    • Diabetes (HCC)    • Hemorrhoids    • Osteoporosis 03/12/2019     No Known Allergies  Past Surgical History:   Procedure Laterality Date   • COLONOSCOPY  2015    NORMAL PER PT-REPEAT IN 2025     Social History     Socioeconomic History   • Marital status:    Tobacco Use   • Smoking status: Never   • Smokeless tobacco: Never   Vaping Use   • Vaping Use: Never used   Substance and Sexual Activity   • Alcohol use: Defer   • Drug use: Defer   • Sexual activity: Defer     Family History   Family history unknown: Yes       Current Outpatient Medications:   •  atorvastatin (Lipitor) 10 MG tablet, Take 1 tablet by mouth Every Night., Disp: 90 tablet, Rfl: 1  •  calcium carbonate (OS-SARAH) 1250 (500 Ca) MG tablet, Take 2 tablets by mouth Daily., Disp: 180 tablet, Rfl: 3  •  lidocaine (LIDODERM) 5 %, Place 1 patch on the skin as directed by provider Daily. Remove & Discard patch within 12 hours or as directed by provider, Disp: 90 each, Rfl: 1  •  loratadine (Claritin) 10 MG tablet, Take 1 tablet by mouth Daily., Disp: 90 tablet, Rfl: 1  •  metFORMIN (GLUCOPHAGE) 500 MG tablet, Take 1 tablet by mouth Daily With Breakfast., Disp: 90 tablet, Rfl: 3  •  vitamin D (ERGOCALCIFEROL) 1.25 MG (66218 UT) capsule capsule, Take 1 capsule by mouth Every 7 (Seven) Days., Disp: 13 capsule, Rfl: 3'

## 2023-04-05 RX ORDER — PEG-3350, SODIUM SULFATE, SODIUM CHLORIDE, POTASSIUM CHLORIDE, SODIUM ASCORBATE AND ASCORBIC ACID 7.5-2.691G
1000 KIT ORAL EVERY 12 HOURS
Qty: 1000 ML | Refills: 0 | Status: ON HOLD | OUTPATIENT
Start: 2023-04-05 | End: 2023-04-19

## 2023-04-05 NOTE — TELEPHONE ENCOUNTER
I have called and spoke to patient with an upcoming procedure reminder. Patient confirmed.   Pt is requesting prep resent to keith flaherty.

## 2023-04-13 ENCOUNTER — PREP FOR SURGERY (OUTPATIENT)
Dept: OTHER | Facility: HOSPITAL | Age: 68
End: 2023-04-13
Payer: MEDICARE

## 2023-04-14 NOTE — PRE-PROCEDURE INSTRUCTIONS
Arrival-time of 0930.    Must have a  for transportation home post-procedure.    Education provided on laxative administration; bowel prep to be taken in two doses.  Reviewed diet instructions for day prior to procedure.  Only plain, unflavored water after midnight until two hours prior to arrival-time.    Do not take any morning medications on the day of the procedure.  Instead bring all prescribed medications  to the hospital on the morning of the procedure.     Instructed to make sure and not take any Metformin on the evening prior to the procedure.    Patient verbalized understanding of instructions.    Rosa Maria River RN    
None

## 2023-04-17 ENCOUNTER — TELEPHONE (OUTPATIENT)
Dept: FAMILY MEDICINE CLINIC | Facility: CLINIC | Age: 68
End: 2023-04-17

## 2023-04-17 NOTE — TELEPHONE ENCOUNTER
Caller: Trish Cool    Relationship to patient: Self    Best call back number: [phone]    Chief complaint: SHARP PAIN IN NECK AND RIGHT EAR     Type of visit: OFFICE    Requested date: THIS WEEK.     Additional notes:PATIENT WOULD LIKE TO BE SEEN SOMETIME THIS WEEK

## 2023-04-18 ENCOUNTER — TELEPHONE (OUTPATIENT)
Dept: FAMILY MEDICINE CLINIC | Facility: CLINIC | Age: 68
End: 2023-04-18
Payer: MEDICARE

## 2023-04-19 ENCOUNTER — ANESTHESIA (OUTPATIENT)
Dept: GASTROENTEROLOGY | Facility: HOSPITAL | Age: 68
End: 2023-04-19
Payer: MEDICARE

## 2023-04-19 ENCOUNTER — HOSPITAL ENCOUNTER (OUTPATIENT)
Facility: HOSPITAL | Age: 68
Setting detail: HOSPITAL OUTPATIENT SURGERY
Discharge: HOME | End: 2023-04-19
Attending: INTERNAL MEDICINE | Admitting: INTERNAL MEDICINE
Payer: MEDICARE

## 2023-04-19 ENCOUNTER — ANESTHESIA EVENT (OUTPATIENT)
Dept: GASTROENTEROLOGY | Facility: HOSPITAL | Age: 68
End: 2023-04-19
Payer: MEDICARE

## 2023-04-19 VITALS
DIASTOLIC BLOOD PRESSURE: 60 MMHG | WEIGHT: 130.07 LBS | RESPIRATION RATE: 16 BRPM | TEMPERATURE: 97.4 F | SYSTOLIC BLOOD PRESSURE: 106 MMHG | HEART RATE: 74 BPM | HEIGHT: 67 IN | OXYGEN SATURATION: 99 % | BODY MASS INDEX: 20.42 KG/M2

## 2023-04-19 LAB — GLUCOSE BLDC GLUCOMTR-MCNC: 115 MG/DL (ref 70–99)

## 2023-04-19 PROCEDURE — 25010000002 PROPOFOL 10 MG/ML EMULSION: Performed by: NURSE ANESTHETIST, CERTIFIED REGISTERED

## 2023-04-19 PROCEDURE — G0121 COLON CA SCRN NOT HI RSK IND: HCPCS | Performed by: INTERNAL MEDICINE

## 2023-04-19 PROCEDURE — 82962 GLUCOSE BLOOD TEST: CPT

## 2023-04-19 RX ORDER — LIDOCAINE HYDROCHLORIDE 20 MG/ML
INJECTION, SOLUTION EPIDURAL; INFILTRATION; INTRACAUDAL; PERINEURAL AS NEEDED
Status: DISCONTINUED | OUTPATIENT
Start: 2023-04-19 | End: 2023-04-19 | Stop reason: SURG

## 2023-04-19 RX ORDER — PROPOFOL 10 MG/ML
VIAL (ML) INTRAVENOUS AS NEEDED
Status: DISCONTINUED | OUTPATIENT
Start: 2023-04-19 | End: 2023-04-19 | Stop reason: SURG

## 2023-04-19 RX ORDER — EPHEDRINE SULFATE 50 MG/ML
INJECTION, SOLUTION INTRAVENOUS AS NEEDED
Status: DISCONTINUED | OUTPATIENT
Start: 2023-04-19 | End: 2023-04-19 | Stop reason: SURG

## 2023-04-19 RX ORDER — SODIUM CHLORIDE, SODIUM LACTATE, POTASSIUM CHLORIDE, CALCIUM CHLORIDE 600; 310; 30; 20 MG/100ML; MG/100ML; MG/100ML; MG/100ML
30 INJECTION, SOLUTION INTRAVENOUS CONTINUOUS
Status: DISCONTINUED | OUTPATIENT
Start: 2023-04-19 | End: 2023-04-19 | Stop reason: HOSPADM

## 2023-04-19 RX ADMIN — SODIUM CHLORIDE, POTASSIUM CHLORIDE, SODIUM LACTATE AND CALCIUM CHLORIDE: 600; 310; 30; 20 INJECTION, SOLUTION INTRAVENOUS at 10:50

## 2023-04-19 RX ADMIN — EPHEDRINE SULFATE 10 MG: 50 INJECTION INTRAVENOUS at 11:09

## 2023-04-19 RX ADMIN — PROPOFOL 200 MCG/KG/MIN: 10 INJECTION, EMULSION INTRAVENOUS at 10:50

## 2023-04-19 RX ADMIN — PROPOFOL 80 MG: 10 INJECTION, EMULSION INTRAVENOUS at 10:50

## 2023-04-19 RX ADMIN — EPHEDRINE SULFATE 10 MG: 50 INJECTION INTRAVENOUS at 10:55

## 2023-04-19 RX ADMIN — LIDOCAINE HYDROCHLORIDE 60 MG: 20 INJECTION, SOLUTION EPIDURAL; INFILTRATION; INTRACAUDAL; PERINEURAL at 10:50

## 2023-04-19 NOTE — H&P
"Pre Procedure History & Physical    Chief Complaint:   Screening colonoscopy    Subjective     HPI:   68 yo F here for screening colonoscopy.    Past Medical History:   Past Medical History:   Diagnosis Date   • Arthritis    • Diabetes    • Hemorrhoids    • Osteoporosis 03/12/2019       Past Surgical History:  Past Surgical History:   Procedure Laterality Date   • COLONOSCOPY  2015    NORMAL PER PT-REPEAT IN 2025       Family History:  Family History   Family history unknown: Yes       Social History:   reports that she has never smoked. She has never used smokeless tobacco. Alcohol use questions deferred to the physician. Drug use questions deferred to the physician.    Medications:   Medications Prior to Admission   Medication Sig Dispense Refill Last Dose   • calcium carbonate (OS-SARAH) 1250 (500 Ca) MG tablet Take 2 tablets by mouth Daily. 180 tablet 3 Past Week   • lidocaine (LIDODERM) 5 % Place 1 patch on the skin as directed by provider Daily. Remove & Discard patch within 12 hours or as directed by provider 90 each 1 Past Week   • metFORMIN (GLUCOPHAGE) 500 MG tablet Take 1 tablet by mouth Daily With Breakfast. 90 tablet 3 Past Week   • vitamin D (ERGOCALCIFEROL) 1.25 MG (01255 UT) capsule capsule Take 1 capsule by mouth Every 7 (Seven) Days. 13 capsule 3 Past Week   • loratadine (Claritin) 10 MG tablet Take 1 tablet by mouth Daily. 90 tablet 1 More than a month       Allergies:  Patient has no known allergies.    ROS:    Pertinent items are noted in HPI     Objective     Blood pressure 114/66, pulse 82, temperature 98.7 °F (37.1 °C), temperature source Temporal, resp. rate 20, height 170.2 cm (67\"), weight 59 kg (130 lb 1.1 oz), SpO2 98 %.    Physical Exam   Constitutional: Pt is oriented to person, place, and time and well-developed, well-nourished, and in no distress.   Mouth/Throat: Oropharynx is clear and moist.   Neck: Normal range of motion.   Cardiovascular: Normal rate, regular rhythm and normal " heart sounds.    Pulmonary/Chest: Effort normal and breath sounds normal.   Abdominal: Soft. Nontender  Skin: Skin is warm and dry.   Psychiatric: Mood, memory, affect and judgment normal.     Assessment & Plan     Diagnosis:  Screening colonoscopy    Anticipated Surgical Procedure:  Colonoscopy    The risks, benefits, and alternatives of this procedure have been discussed with the patient or the responsible party- the patient understands and agrees to proceed.

## 2023-04-19 NOTE — ANESTHESIA PREPROCEDURE EVALUATION
Anesthesia Evaluation     Patient summary reviewed and Nursing notes reviewed   no history of anesthetic complications:  NPO Solid Status: > 8 hours  NPO Liquid Status: > 2 hours           Airway   Mallampati: I  TM distance: >3 FB  Neck ROM: full  No difficulty expected  Dental - normal exam     Pulmonary - negative pulmonary ROS and normal exam    breath sounds clear to auscultation  Cardiovascular - negative cardio ROS and normal exam  Exercise tolerance: good (4-7 METS)    Rhythm: regular  Rate: normal        Neuro/Psych- negative ROS  GI/Hepatic/Renal/Endo    (+)   diabetes mellitus type 2,     Musculoskeletal     Abdominal    Substance History - negative use     OB/GYN negative ob/gyn ROS         Other   arthritis,      ROS/Med Hx Other: PAT Nursing Notes unavailable.                   Anesthesia Plan    ASA 3     general     (Patient understands anesthesia not responsible for dental damage.)  intravenous induction     Anesthetic plan, risks, benefits, and alternatives have been provided, discussed and informed consent has been obtained with: patient.  Pre-procedure education provided  Use of blood products discussed with patient  Consented to blood products.   Plan discussed with CRNA.        CODE STATUS:

## 2023-04-20 ENCOUNTER — TELEPHONE (OUTPATIENT)
Dept: GASTROENTEROLOGY | Facility: CLINIC | Age: 68
End: 2023-04-20
Payer: MEDICARE

## 2023-04-24 ENCOUNTER — OFFICE VISIT (OUTPATIENT)
Dept: FAMILY MEDICINE CLINIC | Facility: CLINIC | Age: 68
End: 2023-04-24
Payer: MEDICARE

## 2023-04-24 VITALS
HEIGHT: 67 IN | TEMPERATURE: 98.2 F | OXYGEN SATURATION: 97 % | SYSTOLIC BLOOD PRESSURE: 122 MMHG | BODY MASS INDEX: 21 KG/M2 | DIASTOLIC BLOOD PRESSURE: 82 MMHG | HEART RATE: 72 BPM | WEIGHT: 133.8 LBS

## 2023-04-24 DIAGNOSIS — M54.2 ACUTE NECK PAIN: ICD-10-CM

## 2023-04-24 DIAGNOSIS — G89.29 CHRONIC LEFT SHOULDER PAIN: ICD-10-CM

## 2023-04-24 DIAGNOSIS — M25.512 CHRONIC LEFT SHOULDER PAIN: ICD-10-CM

## 2023-04-24 DIAGNOSIS — D21.9 LEIOMYOMA: ICD-10-CM

## 2023-04-24 DIAGNOSIS — E55.9 VITAMIN D DEFICIENCY: ICD-10-CM

## 2023-04-24 DIAGNOSIS — N94.89 PELVIC CONGESTION SYNDROME: Primary | ICD-10-CM

## 2023-04-24 DIAGNOSIS — Z23 NEED FOR TDAP VACCINATION: ICD-10-CM

## 2023-04-24 DIAGNOSIS — E78.2 MIXED HYPERLIPIDEMIA: ICD-10-CM

## 2023-04-24 DIAGNOSIS — Z23 NEED FOR PNEUMOCOCCAL VACCINE: ICD-10-CM

## 2023-04-24 DIAGNOSIS — Z11.59 NEED FOR HEPATITIS C SCREENING TEST: ICD-10-CM

## 2023-04-24 DIAGNOSIS — E11.65 TYPE 2 DIABETES MELLITUS WITH HYPERGLYCEMIA, WITHOUT LONG-TERM CURRENT USE OF INSULIN: ICD-10-CM

## 2023-04-24 PROBLEM — M79.672 LEFT FOOT PAIN: Status: ACTIVE | Noted: 2023-04-24

## 2023-04-24 RX ORDER — LIDOCAINE 50 MG/G
1 PATCH TOPICAL EVERY 24 HOURS
Qty: 90 EACH | Refills: 1 | Status: SHIPPED | OUTPATIENT
Start: 2023-04-24

## 2023-04-24 RX ORDER — IBUPROFEN 200 MG
2 CAPSULE ORAL DAILY
Qty: 180 TABLET | Refills: 3 | Status: SHIPPED | OUTPATIENT
Start: 2023-04-24

## 2023-04-24 NOTE — PROGRESS NOTES
"Chief Complaint  Left shoulder pain  Right sided neck pain    Irwin Cool presents to [facility]  History of Present Illness  Patient presents today to discuss left shoulder pain as well as right-sided neck pain.  Her left shoulder has been bothering her for many years.  It recently started bothering her again.  She had previously injured it several years ago.  As far as her right neck is concerned it started hurting her about 2 weeks ago.  She is felt a sharp pain on the right side of her neck.  No reported swelling or appreciable masses noted.  She did have a colonoscopy completed.  This showed on 4/19/2023 that she had hemorrhoids as well as moderate diverticulosis.  She does have periodic issues with lower pelvic pain.  She was previously noted to have a 3.5 cm solid-appearing mass in the right lower adnexal region on the right side favored to represent a leiomyoma as well as dilatation of the gonadal and parametrial veins left greater than right that can be seen with pelvic congestion syndrome.  She does occasionally have pain in the lower pelvic region.  I did offer previously referral to OB/GYN but she has declined.  We discussed monitoring at this time.  Should her symptoms worsen we may need to consider repeating imaging or referral to OB/GYN.  She would like to observe at this time.  She is due to have labs done and will return fasting to have these done.  She has previously been noted to be in the diabetic range at 6.5% with her A1c.  She is working on lifestyle changes including diet and exercise.  She is also taking metformin 500 mg daily.  Objective   Vital Signs:  /82 (BP Location: Left arm, Patient Position: Sitting)   Pulse 72   Temp 98.2 °F (36.8 °C) (Oral)   Ht 170.2 cm (67\")   Wt 60.7 kg (133 lb 12.8 oz)   SpO2 97%   BMI 20.96 kg/m²   Estimated body mass index is 20.96 kg/m² as calculated from the following:    Height as of this " "encounter: 170.2 cm (67\").    Weight as of this encounter: 60.7 kg (133 lb 12.8 oz).       BMI is within normal parameters. No other follow-up for BMI required.      Physical Exam   General: AAO ×3, no acute distress, pleasant  HEENT: Normocephalic, atraumatic  Cardiovascular: Regular rate and rhythm without appreciable murmur  Musculoskeletal: Left shoulder demonstrates positive empty can test and positive Remigio liftoff testing.  Negative external rotation testing.  She is limited in range of motion in flexion and abduction in her left shoulder.  There is paraspinal hypertonicity noted of the cervical spine on the right side.  Slightly tender to palpation.  No appreciable mass.  Respiratory: Clear to auscultation bilaterally no RRW  Gastrointestinal: Soft nontender nondistended with bowel sounds present  extremities: No edema  Neurologic: CN II through XII grossly intact   Psychiatric: Normal mood and affect  Result Review :                   Assessment and Plan   Diagnoses and all orders for this visit:    1. Pelvic congestion syndrome (Primary)    2. Chronic left shoulder pain  -     lidocaine (LIDODERM) 5 %; Place 1 patch on the skin as directed by provider Daily. Remove & Discard patch within 12 hours or as directed by provider  Dispense: 90 each; Refill: 1  -     XR Shoulder 2+ View Left; Future    3. Type 2 diabetes mellitus with hyperglycemia, without long-term current use of insulin  -     CBC & Differential; Future  -     Comprehensive Metabolic Panel; Future  -     Hemoglobin A1c; Future    4. Mixed hyperlipidemia  -     Lipid Panel; Future    5. Need for Tdap vaccination    6. Need for pneumococcal vaccine    7. Acute neck pain  -     XR Spine Cervical Complete 4 or 5 View; Future    8. Need for hepatitis C screening test  -     Hepatitis C Antibody; Future    9. Vitamin D deficiency  -     Vitamin D,25-Hydroxy; Future    10. Leiomyoma    Other orders  -     calcium carbonate (OS-SARAH) 1250 (500 Ca) MG " tablet; Take 2 tablets by mouth Daily.  Dispense: 180 tablet; Refill: 3  -     Diclofenac Sodium (VOLTAREN) 1 % gel gel; Apply 4 g topically to the appropriate area as directed 4 (Four) Times a Day As Needed (pain).  Dispense: 100 g; Refill: 3    I discussed with patient getting a x-ray of the cervical spine as well as a left shoulder x-ray.  We did discuss using Voltaren gel.  She is not interested in more aggressive management at this time including a muscle relaxer to help out with her neck.  I suspect a acute muscle strain of her neck.  Her shoulder issue appears to be acute on chronic.  We discussed a trial of Voltaren gel.  Should symptoms worsen she is instructed to call return.  Plan also check a lipid panel again.  She has previously been prescribed atorvastatin but did not take it.  She was previously started on 10 mg.  Plan to reassess.  As far as the pelvic congestion syndrome is concerned as well as the likely leiomyoma.  She is not interested in further evaluation at this time.         Follow Up   Return in about 6 months (around 10/24/2023) for diabetes.  Patient was given instructions and counseling regarding her condition or for health maintenance advice. Please see specific information pulled into the AVS if appropriate.

## 2023-05-19 ENCOUNTER — HOSPITAL ENCOUNTER (OUTPATIENT)
Dept: GENERAL RADIOLOGY | Facility: HOSPITAL | Age: 68
Discharge: HOME | End: 2023-05-19
Payer: MEDICARE

## 2023-05-19 DIAGNOSIS — M25.512 CHRONIC LEFT SHOULDER PAIN: ICD-10-CM

## 2023-05-19 DIAGNOSIS — M54.2 ACUTE NECK PAIN: ICD-10-CM

## 2023-05-19 DIAGNOSIS — G89.29 CHRONIC LEFT SHOULDER PAIN: ICD-10-CM

## 2023-05-19 PROCEDURE — 73030 X-RAY EXAM OF SHOULDER: CPT

## 2023-05-19 PROCEDURE — 72050 X-RAY EXAM NECK SPINE 4/5VWS: CPT

## 2023-10-24 ENCOUNTER — OFFICE VISIT (OUTPATIENT)
Dept: FAMILY MEDICINE CLINIC | Facility: CLINIC | Age: 68
End: 2023-10-24
Payer: MEDICARE

## 2023-10-24 VITALS
DIASTOLIC BLOOD PRESSURE: 68 MMHG | WEIGHT: 135 LBS | BODY MASS INDEX: 21.19 KG/M2 | SYSTOLIC BLOOD PRESSURE: 116 MMHG | HEIGHT: 67 IN | HEART RATE: 64 BPM | TEMPERATURE: 97.4 F | OXYGEN SATURATION: 98 %

## 2023-10-24 DIAGNOSIS — E78.2 MIXED HYPERLIPIDEMIA: ICD-10-CM

## 2023-10-24 DIAGNOSIS — E55.9 VITAMIN D DEFICIENCY: ICD-10-CM

## 2023-10-24 DIAGNOSIS — N94.89 PELVIC CONGESTION SYNDROME: ICD-10-CM

## 2023-10-24 DIAGNOSIS — D21.9 FIBROID: ICD-10-CM

## 2023-10-24 DIAGNOSIS — E11.65 TYPE 2 DIABETES MELLITUS WITH HYPERGLYCEMIA, WITHOUT LONG-TERM CURRENT USE OF INSULIN: Primary | ICD-10-CM

## 2023-10-24 DIAGNOSIS — Z12.31 VISIT FOR SCREENING MAMMOGRAM: ICD-10-CM

## 2023-10-24 DIAGNOSIS — M25.512 CHRONIC LEFT SHOULDER PAIN: ICD-10-CM

## 2023-10-24 DIAGNOSIS — G89.29 CHRONIC LEFT SHOULDER PAIN: ICD-10-CM

## 2023-10-24 DIAGNOSIS — Z23 NEED FOR INFLUENZA VACCINATION: ICD-10-CM

## 2023-10-24 RX ORDER — LIDOCAINE 50 MG/G
1 PATCH TOPICAL EVERY 24 HOURS
Qty: 90 EACH | Refills: 1 | Status: SHIPPED | OUTPATIENT
Start: 2023-10-24

## 2023-10-24 RX ORDER — ERGOCALCIFEROL 1.25 MG/1
50000 CAPSULE ORAL
Qty: 13 CAPSULE | Refills: 3 | Status: SHIPPED | OUTPATIENT
Start: 2023-10-24

## 2023-10-24 RX ORDER — LORATADINE 10 MG/1
10 TABLET ORAL DAILY
Qty: 90 TABLET | Refills: 1 | Status: SHIPPED | OUTPATIENT
Start: 2023-10-24

## 2023-10-24 RX ORDER — ATORVASTATIN CALCIUM 10 MG/1
10 TABLET, FILM COATED ORAL DAILY
Qty: 90 TABLET | Refills: 1 | Status: SHIPPED | OUTPATIENT
Start: 2023-10-24

## 2023-10-24 NOTE — PROGRESS NOTES
Chief Complaint  HLD      Irwin Cool presents to [facility]  History of Present Illness  Patient presents today to follow-up for hyperlipidemia.  She had labs drawn back in July through [facility].  We reviewed these results.  Her white blood cell count, platelet and hemoglobin levels were normal.  Glucose level slightly elevated at 108.  Total cholesterol was 207 with LDL cholesterol 117.  A1c was controlled at 6.4%.  Previously she has been as high as 6.5%.  Her vitamin D level is adequate.  She is on supplementation.  She continues to take metformin 500 mg daily.  We did discuss adding Lipitor today which she is agreeable to doing.  Risk factors include diabetes.  She tested negative for hepatitis C.  I last saw her for a visit on 4/24/2023.  At that time we had discussed periodic issues with lower pelvic pain. She was previously noted to have a 3.5 cm solid-appearing mass in the right lower adnexal region on the right side favored to represent a leiomyoma as well as dilatation of the gonadal and parametrial veins left greater than right that can be seen with pelvic congestion syndrome.  She does occasionally have pain in the lower pelvic region.  I did offer previously referral to OB/GYN but she has declined.  We discussed referral to OB/GYN but patient declines today.  We discussed monitoring at this time.  Should her symptoms worsen we may need to consider repeating imaging or referral to OB/GYN.  She would like to continue observing at this time as she has not had any more symptoms.  Patient is due for flu vaccination but would like to hold off at this time and have her flu vaccine done with her .  She is due for mammogram so we discussed getting this ordered.  Her symptoms of her left shoulder pain have been stable.  She does have arthritis noted previously on her x-ray.  She does use Voltaren gel which helps out.  Objective   Vital Signs:  /68  "(BP Location: Left arm, Patient Position: Sitting)   Pulse 64   Temp 97.4 °F (36.3 °C) (Oral)   Ht 170.2 cm (67\")   Wt 61.2 kg (135 lb)   SpO2 98%   BMI 21.14 kg/m²   Estimated body mass index is 21.14 kg/m² as calculated from the following:    Height as of this encounter: 170.2 cm (67\").    Weight as of this encounter: 61.2 kg (135 lb).       BMI is within normal parameters. No other follow-up for BMI required.      Physical Exam  Vitals reviewed.   Constitutional:       Appearance: Normal appearance.   HENT:      Head: Normocephalic and atraumatic.      Mouth/Throat:      Pharynx: No oropharyngeal exudate.   Eyes:      Conjunctiva/sclera: Conjunctivae normal.   Cardiovascular:      Rate and Rhythm: Normal rate and regular rhythm.      Heart sounds: No murmur heard.     No friction rub. No gallop.   Pulmonary:      Effort: Pulmonary effort is normal.      Breath sounds: Normal breath sounds. No wheezing or rhonchi.   Abdominal:      General: Bowel sounds are normal. There is no distension.      Palpations: Abdomen is soft.      Tenderness: There is no abdominal tenderness.   Skin:     General: Skin is warm and dry.   Neurological:      Mental Status: She is alert and oriented to person, place, and time.      Cranial Nerves: No cranial nerve deficit.   Psychiatric:         Mood and Affect: Mood and affect normal.         Behavior: Behavior normal.         Thought Content: Thought content normal.         Judgment: Judgment normal.        Result Review :                   Assessment and Plan   Diagnoses and all orders for this visit:    1. Type 2 diabetes mellitus with hyperglycemia, without long-term current use of insulin (Primary)  -     CBC & Differential; Future  -     Comprehensive Metabolic Panel; Future  -     Hemoglobin A1c; Future  -     Microalbumin / Creatinine Urine Ratio - Urine, Clean Catch; Future    2. Chronic left shoulder pain  -     lidocaine (LIDODERM) 5 %; Place 1 patch on the skin as " directed by provider Daily. Remove & Discard patch within 12 hours or as directed by provider  Dispense: 90 each; Refill: 1    3. Mixed hyperlipidemia  -     Lipid Panel; Future    4. Need for influenza vaccination    5. Visit for screening mammogram  -     Mammo Screening Digital Tomosynthesis Bilateral With CAD; Future    6. Vitamin D deficiency  -     Vitamin D,25-Hydroxy; Future    7. Pelvic congestion syndrome    8. Fibroid    Other orders  -     atorvastatin (Lipitor) 10 MG tablet; Take 1 tablet by mouth Daily.  Dispense: 90 tablet; Refill: 1  -     metFORMIN (GLUCOPHAGE) 500 MG tablet; Take 1 tablet by mouth Daily With Breakfast.  Dispense: 90 tablet; Refill: 3  -     loratadine (Claritin) 10 MG tablet; Take 1 tablet by mouth Daily.  Dispense: 90 tablet; Refill: 1  -     Diclofenac Sodium (VOLTAREN) 1 % gel gel; Apply 4 g topically to the appropriate area as directed 4 (Four) Times a Day As Needed (pain).  Dispense: 100 g; Refill: 3  -     Calcium Carbonate 1500 (600 Ca) MG tablet; Take 2 tablets by mouth Daily.  Dispense: 180 tablet; Refill: 3  -     vitamin D (ERGOCALCIFEROL) 1.25 MG (20725 UT) capsule capsule; Take 1 capsule by mouth Every 7 (Seven) Days.  Dispense: 13 capsule; Refill: 3    Plan as documented above.  Lipitor has been sent in for treatment of hyperlipidemia.  I discussed with patient sending in refills today.  We discussed her mammogram order.  We discussed having labs repeated in 6 months.  As far as her fibroid is concerned as well as pelvic congestion syndrome she is asymptomatic at this time.  She denies any vaginal bleeding.  We discussed monitoring at this time.         Follow Up   Return in about 6 months (around 4/24/2024) for HLD.  Patient was given instructions and counseling regarding her condition or for health maintenance advice. Please see specific information pulled into the AVS if appropriate.

## 2023-12-09 ENCOUNTER — HOSPITAL ENCOUNTER (OUTPATIENT)
Dept: MAMMOGRAPHY | Facility: HOSPITAL | Age: 68
Discharge: HOME | End: 2023-12-09
Admitting: FAMILY MEDICINE
Payer: MEDICARE

## 2023-12-09 DIAGNOSIS — Z12.31 VISIT FOR SCREENING MAMMOGRAM: ICD-10-CM

## 2023-12-09 PROCEDURE — 77067 SCR MAMMO BI INCL CAD: CPT

## 2023-12-09 PROCEDURE — 77063 BREAST TOMOSYNTHESIS BI: CPT

## 2024-06-05 ENCOUNTER — OFFICE VISIT (OUTPATIENT)
Dept: FAMILY MEDICINE CLINIC | Facility: CLINIC | Age: 69
End: 2024-06-05
Payer: MEDICARE

## 2024-06-05 VITALS
WEIGHT: 139 LBS | BODY MASS INDEX: 21.82 KG/M2 | DIASTOLIC BLOOD PRESSURE: 72 MMHG | OXYGEN SATURATION: 95 % | HEIGHT: 67 IN | HEART RATE: 70 BPM | TEMPERATURE: 97.8 F | SYSTOLIC BLOOD PRESSURE: 104 MMHG

## 2024-06-05 DIAGNOSIS — E55.9 VITAMIN D DEFICIENCY: ICD-10-CM

## 2024-06-05 DIAGNOSIS — Z23 NEED FOR PNEUMOCOCCAL 20-VALENT CONJUGATE VACCINATION: ICD-10-CM

## 2024-06-05 DIAGNOSIS — G89.29 CHRONIC LEFT SHOULDER PAIN: ICD-10-CM

## 2024-06-05 DIAGNOSIS — M94.0 COSTOCHONDRITIS: ICD-10-CM

## 2024-06-05 DIAGNOSIS — Z23 NEED FOR TDAP VACCINATION: ICD-10-CM

## 2024-06-05 DIAGNOSIS — E11.65 TYPE 2 DIABETES MELLITUS WITH HYPERGLYCEMIA, WITHOUT LONG-TERM CURRENT USE OF INSULIN: Primary | ICD-10-CM

## 2024-06-05 DIAGNOSIS — M25.512 CHRONIC LEFT SHOULDER PAIN: ICD-10-CM

## 2024-06-05 DIAGNOSIS — Z11.59 NEED FOR HEPATITIS C SCREENING TEST: ICD-10-CM

## 2024-06-05 DIAGNOSIS — Z12.31 VISIT FOR SCREENING MAMMOGRAM: ICD-10-CM

## 2024-06-05 DIAGNOSIS — E78.2 MIXED HYPERLIPIDEMIA: ICD-10-CM

## 2024-06-05 LAB
25(OH)D3 SERPL-MCNC: 69.8 NG/ML (ref 30–100)
ALBUMIN SERPL-MCNC: 4.5 G/DL (ref 3.5–5.2)
ALBUMIN UR-MCNC: <1.2 MG/DL
ALBUMIN/GLOB SERPL: 1.7 G/DL
ALP SERPL-CCNC: 76 U/L (ref 39–117)
ALT SERPL W P-5'-P-CCNC: 18 U/L (ref 1–33)
ANION GAP SERPL CALCULATED.3IONS-SCNC: 8.6 MMOL/L (ref 5–15)
AST SERPL-CCNC: 22 U/L (ref 1–32)
BASOPHILS # BLD AUTO: 0.05 10*3/MM3 (ref 0–0.2)
BASOPHILS NFR BLD AUTO: 0.9 % (ref 0–1.5)
BILIRUB SERPL-MCNC: 0.4 MG/DL (ref 0–1.2)
BUN SERPL-MCNC: 10 MG/DL (ref 8–23)
BUN/CREAT SERPL: 16.7 (ref 7–25)
CALCIUM SPEC-SCNC: 9.3 MG/DL (ref 8.6–10.5)
CHLORIDE SERPL-SCNC: 103 MMOL/L (ref 98–107)
CHOLEST SERPL-MCNC: 147 MG/DL (ref 0–200)
CO2 SERPL-SCNC: 26.4 MMOL/L (ref 22–29)
CREAT SERPL-MCNC: 0.6 MG/DL (ref 0.57–1)
CREAT UR-MCNC: 23.5 MG/DL
DEPRECATED RDW RBC AUTO: 38 FL (ref 37–54)
EGFRCR SERPLBLD CKD-EPI 2021: 97.3 ML/MIN/1.73
EOSINOPHIL # BLD AUTO: 0.04 10*3/MM3 (ref 0–0.4)
EOSINOPHIL NFR BLD AUTO: 0.7 % (ref 0.3–6.2)
ERYTHROCYTE [DISTWIDTH] IN BLOOD BY AUTOMATED COUNT: 11.5 % (ref 12.3–15.4)
GLOBULIN UR ELPH-MCNC: 2.6 GM/DL
GLUCOSE SERPL-MCNC: 104 MG/DL (ref 65–99)
HBA1C MFR BLD: 6.3 % (ref 4.8–5.6)
HCT VFR BLD AUTO: 40.9 % (ref 34–46.6)
HCV AB SER QL: NORMAL
HDLC SERPL-MCNC: 60 MG/DL (ref 40–60)
HGB BLD-MCNC: 13.3 G/DL (ref 12–15.9)
IMM GRANULOCYTES # BLD AUTO: 0.01 10*3/MM3 (ref 0–0.05)
IMM GRANULOCYTES NFR BLD AUTO: 0.2 % (ref 0–0.5)
LDLC SERPL CALC-MCNC: 67 MG/DL (ref 0–100)
LDLC/HDLC SERPL: 1.07 {RATIO}
LYMPHOCYTES # BLD AUTO: 2.27 10*3/MM3 (ref 0.7–3.1)
LYMPHOCYTES NFR BLD AUTO: 39.1 % (ref 19.6–45.3)
MCH RBC QN AUTO: 29.6 PG (ref 26.6–33)
MCHC RBC AUTO-ENTMCNC: 32.5 G/DL (ref 31.5–35.7)
MCV RBC AUTO: 90.9 FL (ref 79–97)
MICROALBUMIN/CREAT UR: NORMAL MG/G{CREAT}
MONOCYTES # BLD AUTO: 0.39 10*3/MM3 (ref 0.1–0.9)
MONOCYTES NFR BLD AUTO: 6.7 % (ref 5–12)
NEUTROPHILS NFR BLD AUTO: 3.05 10*3/MM3 (ref 1.7–7)
NEUTROPHILS NFR BLD AUTO: 52.4 % (ref 42.7–76)
NRBC BLD AUTO-RTO: 0 /100 WBC (ref 0–0.2)
PLATELET # BLD AUTO: 220 10*3/MM3 (ref 140–450)
PMV BLD AUTO: 11.5 FL (ref 6–12)
POTASSIUM SERPL-SCNC: 4.1 MMOL/L (ref 3.5–5.2)
PROT SERPL-MCNC: 7.1 G/DL (ref 6–8.5)
RBC # BLD AUTO: 4.5 10*6/MM3 (ref 3.77–5.28)
SODIUM SERPL-SCNC: 138 MMOL/L (ref 136–145)
TRIGL SERPL-MCNC: 115 MG/DL (ref 0–150)
VLDLC SERPL-MCNC: 20 MG/DL (ref 5–40)
WBC NRBC COR # BLD AUTO: 5.81 10*3/MM3 (ref 3.4–10.8)

## 2024-06-05 PROCEDURE — 90715 TDAP VACCINE 7 YRS/> IM: CPT | Performed by: FAMILY MEDICINE

## 2024-06-05 PROCEDURE — 82570 ASSAY OF URINE CREATININE: CPT | Performed by: FAMILY MEDICINE

## 2024-06-05 PROCEDURE — 1126F AMNT PAIN NOTED NONE PRSNT: CPT | Performed by: FAMILY MEDICINE

## 2024-06-05 PROCEDURE — 80061 LIPID PANEL: CPT | Performed by: FAMILY MEDICINE

## 2024-06-05 PROCEDURE — 85025 COMPLETE CBC W/AUTO DIFF WBC: CPT | Performed by: FAMILY MEDICINE

## 2024-06-05 PROCEDURE — 90471 IMMUNIZATION ADMIN: CPT | Performed by: FAMILY MEDICINE

## 2024-06-05 PROCEDURE — 86803 HEPATITIS C AB TEST: CPT | Performed by: FAMILY MEDICINE

## 2024-06-05 PROCEDURE — 82306 VITAMIN D 25 HYDROXY: CPT | Performed by: FAMILY MEDICINE

## 2024-06-05 PROCEDURE — 82043 UR ALBUMIN QUANTITATIVE: CPT | Performed by: FAMILY MEDICINE

## 2024-06-05 PROCEDURE — 80053 COMPREHEN METABOLIC PANEL: CPT | Performed by: FAMILY MEDICINE

## 2024-06-05 PROCEDURE — 83036 HEMOGLOBIN GLYCOSYLATED A1C: CPT | Performed by: FAMILY MEDICINE

## 2024-06-05 PROCEDURE — 36415 COLL VENOUS BLD VENIPUNCTURE: CPT | Performed by: FAMILY MEDICINE

## 2024-06-05 PROCEDURE — 99214 OFFICE O/P EST MOD 30 MIN: CPT | Performed by: FAMILY MEDICINE

## 2024-06-05 RX ORDER — ATORVASTATIN CALCIUM 10 MG/1
10 TABLET, FILM COATED ORAL DAILY
Qty: 90 TABLET | Refills: 3 | Status: SHIPPED | OUTPATIENT
Start: 2024-06-05

## 2024-06-05 RX ORDER — ERGOCALCIFEROL 1.25 MG/1
50000 CAPSULE ORAL
Qty: 13 CAPSULE | Refills: 3 | Status: SHIPPED | OUTPATIENT
Start: 2024-06-05

## 2024-06-05 RX ORDER — LIDOCAINE 50 MG/G
1 PATCH TOPICAL EVERY 24 HOURS
Qty: 90 EACH | Refills: 1 | Status: SHIPPED | OUTPATIENT
Start: 2024-06-05

## 2024-06-05 RX ORDER — LORATADINE 10 MG/1
10 TABLET ORAL DAILY
Qty: 90 TABLET | Refills: 3 | Status: SHIPPED | OUTPATIENT
Start: 2024-06-05

## 2024-06-05 NOTE — PROGRESS NOTES
Chief Complaint  Diabetes  HLD      Subjective          Trish Cool presents to [facility]  Diabetes      Patient presents today to follow-up for diabetes and hyperlipidemia.  She is due for routine labs so we discussed getting these done.  She continues to take metformin 500 mg daily as well as atorvastatin 10 mg daily.  She continues on vitamin D supplementation as well.  Plan to have labs drawn today with further recommendations to follow once results return.  She is due for Tdap and pneumococcal 20 vaccinations.  She would like to get the Tdap done today but consider the pneumococcal 20 on a different day.  I did discuss with her that she may return at her convenience to have this done as well.  She reports that when she was recently on a trip she reached around while sitting in the from a car to reach for something on the back and felt pain on the left side of her chest.  She points to the costal cartilage region on the left anterior aspect of the chest.  This pain is reproducible and does not radiate.  She continues to use lidocaine as needed to help out with muscle aches and pains.  She will be due for mammogram in December.  Her last mammogram was BI-RADS 1-negative.    Current Outpatient Medications   Medication Instructions    atorvastatin (LIPITOR) 10 mg, Oral, Daily    Calcium Carbonate 1,200 mg, Oral, Daily    Diclofenac Sodium (VOLTAREN) 4 g, Topical, 4 Times Daily PRN    lidocaine (LIDODERM) 5 % 1 patch, Transdermal, Every 24 Hours, Remove & Discard patch within 12 hours or as directed by provider    loratadine (CLARITIN) 10 mg, Oral, Daily    metFORMIN (GLUCOPHAGE) 500 mg, Oral, Daily With Breakfast    vitamin D (ERGOCALCIFEROL) 50,000 Units, Oral, Every 7 Days       The following portions of the patient's history were reviewed and updated as appropriate: allergies, current medications, past family history, past medical history, past social history, past surgical  "history, and problem list.    Objective   Vital Signs:   /72   Pulse 70   Temp 97.8 °F (36.6 °C)   Ht 170.2 cm (67\")   Wt 63 kg (139 lb)   SpO2 95%   BMI 21.77 kg/m²     BP Readings from Last 3 Encounters:   06/05/24 104/72   10/24/23 116/68   04/24/23 122/82     Wt Readings from Last 3 Encounters:   06/05/24 63 kg (139 lb)   10/24/23 61.2 kg (135 lb)   04/24/23 60.7 kg (133 lb 12.8 oz)     BMI is within normal parameters. No other follow-up for BMI required.     Physical Exam  Vitals reviewed.   Constitutional:       Appearance: Normal appearance.   HENT:      Head: Normocephalic and atraumatic.      Right Ear: External ear normal.      Left Ear: External ear normal.      Nose: Nose normal.   Eyes:      Conjunctiva/sclera: Conjunctivae normal.   Cardiovascular:      Rate and Rhythm: Normal rate and regular rhythm.      Heart sounds: No murmur heard.     No friction rub. No gallop.   Pulmonary:      Effort: Pulmonary effort is normal.      Breath sounds: Normal breath sounds. No wheezing or rhonchi.   Abdominal:      General: Bowel sounds are normal. There is no distension.      Palpations: Abdomen is soft.      Tenderness: There is no abdominal tenderness.   Musculoskeletal:      Comments: Reproducible tenderness to palpation on the lower aspect of the costal cartilage on the left anterior chest wall.   Skin:     General: Skin is warm and dry.   Neurological:      Mental Status: She is alert and oriented to person, place, and time.      Cranial Nerves: No cranial nerve deficit.   Psychiatric:         Mood and Affect: Mood and affect normal.         Behavior: Behavior normal.         Thought Content: Thought content normal.         Judgment: Judgment normal.            Result Review :   The following data was reviewed by: Artis Arce DO on 06/05/2024:           No results found for: \"SARSANTIGEN\", \"COVID19\", \"RAPFLUA\", \"RAPFLUB\", \"FLUAAG\", \"FLUABDAG\", \"FLU\", \"FLUBAG\", \"RAPSCRN\", \"STREPAAG\", \"RSV\", " "\"POCPREGUR\", \"MONOSPOT\", \"INR\", \"LEADCAPBLD\", \"POCLEAD\", \"BILIRUBINUR\"    Procedures        Assessment and Plan    Diagnoses and all orders for this visit:    1. Type 2 diabetes mellitus with hyperglycemia, without long-term current use of insulin (Primary)  -     CBC & Differential  -     Comprehensive Metabolic Panel  -     Hemoglobin A1c  -     Microalbumin / Creatinine Urine Ratio - Urine, Clean Catch    2. Chronic left shoulder pain  -     lidocaine (LIDODERM) 5 %; Place 1 patch on the skin as directed by provider Daily. Remove & Discard patch within 12 hours or as directed by provider  Dispense: 90 each; Refill: 1    3. Need for hepatitis C screening test  -     Hepatitis C Antibody    4. Need for Tdap vaccination    5. Need for pneumococcal 20-valent conjugate vaccination    6. Vitamin D deficiency  -     Vitamin D,25-Hydroxy    7. Visit for screening mammogram  -     Mammo Screening Digital Tomosynthesis Bilateral With CAD; Future    8. Costochondritis    9. Mixed hyperlipidemia  -     Lipid Panel    Other orders  -     atorvastatin (Lipitor) 10 MG tablet; Take 1 tablet by mouth Daily.  Dispense: 90 tablet; Refill: 3  -     loratadine (Claritin) 10 MG tablet; Take 1 tablet by mouth Daily.  Dispense: 90 tablet; Refill: 3  -     metFORMIN (GLUCOPHAGE) 500 MG tablet; Take 1 tablet by mouth Daily With Breakfast.  Dispense: 90 tablet; Refill: 3  -     vitamin D (ERGOCALCIFEROL) 1.25 MG (96283 UT) capsule capsule; Take 1 capsule by mouth Every 7 (Seven) Days.  Dispense: 13 capsule; Refill: 3  -     Diclofenac Sodium (VOLTAREN) 1 % gel gel; Apply 4 g topically to the appropriate area as directed 4 (Four) Times a Day As Needed (pain).  Dispense: 100 g; Refill: 3    Plan to continue current management of chronic conditions.  I did discuss with her the diagnosis of costochondritis.  The history and physical examination is consistent with this.  I did discuss with her stretching exercises as well as applying ice to the " area.  I did discuss with her that should she have any further issues with this to call or return.  Medications have been refilled today.  Plan to see her back in 6 months.      Medications Discontinued During This Encounter   Medication Reason    atorvastatin (Lipitor) 10 MG tablet Reorder    metFORMIN (GLUCOPHAGE) 500 MG tablet Reorder    loratadine (Claritin) 10 MG tablet Reorder    Diclofenac Sodium (VOLTAREN) 1 % gel gel Reorder    lidocaine (LIDODERM) 5 % Reorder    vitamin D (ERGOCALCIFEROL) 1.25 MG (16208 UT) capsule capsule Reorder          Follow Up   Return in about 6 months (around 12/5/2024) for HLD.  Patient was given instructions and counseling regarding her condition or for health maintenance advice. Please see specific information pulled into the AVS if appropriate.       Artis Arce DO  06/05/24  10:21 EDT

## 2024-09-05 ENCOUNTER — OFFICE VISIT (OUTPATIENT)
Dept: FAMILY MEDICINE CLINIC | Facility: CLINIC | Age: 69
End: 2024-09-05
Payer: MEDICARE

## 2024-09-05 VITALS
HEART RATE: 72 BPM | BODY MASS INDEX: 22.43 KG/M2 | SYSTOLIC BLOOD PRESSURE: 110 MMHG | WEIGHT: 142.9 LBS | HEIGHT: 67 IN | DIASTOLIC BLOOD PRESSURE: 62 MMHG | OXYGEN SATURATION: 99 % | TEMPERATURE: 98 F

## 2024-09-05 DIAGNOSIS — J30.2 SEASONAL ALLERGIES: ICD-10-CM

## 2024-09-05 DIAGNOSIS — L29.9 ITCHING OF EAR: Primary | ICD-10-CM

## 2024-09-05 PROCEDURE — 1126F AMNT PAIN NOTED NONE PRSNT: CPT | Performed by: NURSE PRACTITIONER

## 2024-09-05 PROCEDURE — 1159F MED LIST DOCD IN RCRD: CPT | Performed by: NURSE PRACTITIONER

## 2024-09-05 PROCEDURE — 1160F RVW MEDS BY RX/DR IN RCRD: CPT | Performed by: NURSE PRACTITIONER

## 2024-09-05 PROCEDURE — 3044F HG A1C LEVEL LT 7.0%: CPT | Performed by: NURSE PRACTITIONER

## 2024-09-05 PROCEDURE — 1170F FXNL STATUS ASSESSED: CPT | Performed by: NURSE PRACTITIONER

## 2024-09-05 PROCEDURE — 99213 OFFICE O/P EST LOW 20 MIN: CPT | Performed by: NURSE PRACTITIONER

## 2024-09-05 RX ORDER — HYDROXYZINE HYDROCHLORIDE 25 MG/1
25 TABLET, FILM COATED ORAL 3 TIMES DAILY PRN
Qty: 60 TABLET | Refills: 1 | Status: SHIPPED | OUTPATIENT
Start: 2024-09-05

## 2024-09-05 NOTE — PROGRESS NOTES
"Chief Complaint  ear itching    Subjective        Medical History: has a past medical history of Arthritis, Diabetes, Hemorrhoids, and Osteoporosis (03/12/2019).     Surgical History: has a past surgical history that includes Colonoscopy (2015) and Colonoscopy (N/A, 4/19/2023).     Family History: Family history is unknown by patient.     Social History: reports that she has never smoked. She has never used smokeless tobacco. Alcohol use questions deferred to the physician. Drug use questions deferred to the physician.    Trish Cool presents to [facility]  History of Present Illness  Patient comes in with complaints of ear itching.  She states it has been ongoing for the past several days.Patient was seen in urgent care on 8/26/2024 she was prescribed a ZOLL and was told it was due to allergies that her ear was itching.  She denies any ear pain, decreased hearing or popping of the ear.  Denies any headache, sinus pain or pressure, nausea or vomiting.  She states that ear itching feels severe in nature at times, she is currently taking allergy medicine, and states that she put the baby on the Q-tip and puts it in her ear to aid in the ear itching.  Objective   Vital Signs:   /62   Pulse 72   Temp 98 °F (36.7 °C)   Ht 170.2 cm (67\")   Wt 64.8 kg (142 lb 14.4 oz)   SpO2 99%   BMI 22.38 kg/m²       Wt Readings from Last 3 Encounters:   09/05/24 64.8 kg (142 lb 14.4 oz)   08/26/24 62.8 kg (138 lb 8 oz)   06/05/24 63 kg (139 lb)        BP Readings from Last 3 Encounters:   09/05/24 110/62   08/26/24 108/56   06/05/24 104/72        BMI is within normal parameters. No other follow-up for BMI required.       Physical Exam  Vitals reviewed.   Constitutional:       Appearance: Normal appearance. She is well-developed.   HENT:      Head: Normocephalic and atraumatic.      Right Ear: Tympanic membrane normal.      Left Ear: Tympanic membrane normal.      Ears:      Comments: Mild " erythema bilateral canals  Eyes:      Conjunctiva/sclera: Conjunctivae normal.      Pupils: Pupils are equal, round, and reactive to light.   Cardiovascular:      Rate and Rhythm: Normal rate and regular rhythm.      Heart sounds: No murmur heard.     No gallop.   Pulmonary:      Effort: Pulmonary effort is normal.      Breath sounds: Normal breath sounds. No wheezing.   Skin:     General: Skin is warm and dry.   Neurological:      Mental Status: She is alert and oriented to person, place, and time.   Psychiatric:         Mood and Affect: Mood and affect normal.         Behavior: Behavior normal.         Thought Content: Thought content normal.         Judgment: Judgment normal.        Result Review :  {The following data was reviewed by EDNA Horton on 09/05/2024.  Common labs          6/5/2024    10:51   Common Labs   Glucose 104    BUN 10    Creatinine 0.60    Sodium 138    Potassium 4.1    Chloride 103    Calcium 9.3    Albumin 4.5    Total Bilirubin 0.4    Alkaline Phosphatase 76    AST (SGOT) 22    ALT (SGPT) 18    WBC 5.81    Hemoglobin 13.3    Hematocrit 40.9    Platelets 220    Total Cholesterol 147    Triglycerides 115    HDL Cholesterol 60    LDL Cholesterol  67    Hemoglobin A1C 6.30    Microalbumin, Urine <1.2      Data reviewed : Previous urgent care  note             Current Outpatient Medications on File Prior to Visit   Medication Sig Dispense Refill    atorvastatin (Lipitor) 10 MG tablet Take 1 tablet by mouth Daily. 90 tablet 3    Calcium Carbonate 1500 (600 Ca) MG tablet Take 2 tablets by mouth Daily. 180 tablet 3    Diclofenac Sodium (VOLTAREN) 1 % gel gel Apply 4 g topically to the appropriate area as directed 4 (Four) Times a Day As Needed (pain). 100 g 3    levocetirizine (XYZAL) 5 MG tablet Take 1 tablet by mouth Every Evening for 30 days. 30 tablet 0    lidocaine (LIDODERM) 5 % Place 1 patch on the skin as directed by provider Daily. Remove & Discard patch within 12 hours or  as directed by provider 90 each 1    metFORMIN (GLUCOPHAGE) 500 MG tablet Take 1 tablet by mouth Daily With Breakfast. 90 tablet 3    vitamin D (ERGOCALCIFEROL) 1.25 MG (44791 UT) capsule capsule Take 1 capsule by mouth Every 7 (Seven) Days. 13 capsule 3     No current facility-administered medications on file prior to visit.        Assessment and Plan  Diagnoses and all orders for this visit:    1. Itching of ear (Primary)    2. Seasonal allergies    Other orders  -     hydrOXYzine (ATARAX) 25 MG tablet; Take 1 tablet by mouth 3 (Three) Times a Day As Needed for Itching (ear itiching).  Dispense: 60 tablet; Refill: 1  -     Triamcinolone Acetonide (Nasacort Allergy 24HR) 55 MCG/ACT nasal inhaler; 2 sprays into the nostril(s) as directed by provider Daily.  Dispense: 1 each; Refill: 11    Discussed with patient to stop placing Q-tips in the ear.  Sent over some Atarax to help with the itching, patient given sample of Nasacort to start taking to help with the itching.  Discussed return precautions, patient verbalized understanding agreeable treatment plan.    Follow Up   Return for If symptoms do not improve new concerning symptoms.  Patient was given instructions and counseling regarding her condition or for health maintenance advice. Please see specific information pulled into the AVS if appropriate.       Part of this note may be electronic transcription/translation of spoken language to printed text using the Dragon dictation system

## 2024-09-06 RX ORDER — TRIAMCINOLONE ACETONIDE 55 UG/1
2 SPRAY, METERED NASAL DAILY
Qty: 1 EACH | Refills: 11 | COMMUNITY
Start: 2024-09-06

## 2024-10-30 ENCOUNTER — TELEPHONE (OUTPATIENT)
Dept: FAMILY MEDICINE CLINIC | Facility: CLINIC | Age: 69
End: 2024-10-30
Payer: MEDICARE

## 2024-10-30 NOTE — TELEPHONE ENCOUNTER
"Relay     \"HUB TO RELAY - NEED TO SCHEDULE A MWV BEFORE 12/31/2024...APPT CAN BE SCHEDULED WITH ERON BATEMAN OR EDNA JO IF DR RENEE IS UNAVAILABLE \"                "

## 2024-12-10 ENCOUNTER — OFFICE VISIT (OUTPATIENT)
Dept: FAMILY MEDICINE CLINIC | Facility: CLINIC | Age: 69
End: 2024-12-10
Payer: MEDICARE

## 2024-12-10 VITALS
HEART RATE: 82 BPM | DIASTOLIC BLOOD PRESSURE: 72 MMHG | TEMPERATURE: 98 F | SYSTOLIC BLOOD PRESSURE: 110 MMHG | OXYGEN SATURATION: 99 % | WEIGHT: 139.3 LBS | HEIGHT: 67 IN | BODY MASS INDEX: 21.87 KG/M2

## 2024-12-10 DIAGNOSIS — E78.2 MIXED HYPERLIPIDEMIA: ICD-10-CM

## 2024-12-10 DIAGNOSIS — E11.65 TYPE 2 DIABETES MELLITUS WITH HYPERGLYCEMIA, WITHOUT LONG-TERM CURRENT USE OF INSULIN: ICD-10-CM

## 2024-12-10 DIAGNOSIS — H04.123 DRY EYES: ICD-10-CM

## 2024-12-10 DIAGNOSIS — E55.9 VITAMIN D DEFICIENCY: Primary | ICD-10-CM

## 2024-12-10 DIAGNOSIS — M81.0 OSTEOPOROSIS, UNSPECIFIED OSTEOPOROSIS TYPE, UNSPECIFIED PATHOLOGICAL FRACTURE PRESENCE: ICD-10-CM

## 2024-12-10 LAB
25(OH)D3 SERPL-MCNC: 57.7 NG/ML (ref 30–100)
ALBUMIN SERPL-MCNC: 4.5 G/DL (ref 3.5–5.2)
ALBUMIN/GLOB SERPL: 1.5 G/DL
ALP SERPL-CCNC: 93 U/L (ref 39–117)
ALT SERPL W P-5'-P-CCNC: 22 U/L (ref 1–33)
ANION GAP SERPL CALCULATED.3IONS-SCNC: 9.1 MMOL/L (ref 5–15)
AST SERPL-CCNC: 27 U/L (ref 1–32)
BASOPHILS # BLD AUTO: 0.03 10*3/MM3 (ref 0–0.2)
BASOPHILS NFR BLD AUTO: 0.5 % (ref 0–1.5)
BILIRUB SERPL-MCNC: 0.5 MG/DL (ref 0–1.2)
BUN SERPL-MCNC: 12 MG/DL (ref 8–23)
BUN/CREAT SERPL: 20.7 (ref 7–25)
CALCIUM SPEC-SCNC: 10.1 MG/DL (ref 8.6–10.5)
CHLORIDE SERPL-SCNC: 104 MMOL/L (ref 98–107)
CHOLEST SERPL-MCNC: 180 MG/DL (ref 0–200)
CO2 SERPL-SCNC: 27.9 MMOL/L (ref 22–29)
CREAT SERPL-MCNC: 0.58 MG/DL (ref 0.57–1)
DEPRECATED RDW RBC AUTO: 37.3 FL (ref 37–54)
EGFRCR SERPLBLD CKD-EPI 2021: 98.1 ML/MIN/1.73
EOSINOPHIL # BLD AUTO: 0.04 10*3/MM3 (ref 0–0.4)
EOSINOPHIL NFR BLD AUTO: 0.7 % (ref 0.3–6.2)
ERYTHROCYTE [DISTWIDTH] IN BLOOD BY AUTOMATED COUNT: 11.4 % (ref 12.3–15.4)
GLOBULIN UR ELPH-MCNC: 3 GM/DL
GLUCOSE SERPL-MCNC: 101 MG/DL (ref 65–99)
HBA1C MFR BLD: 6.7 % (ref 4.8–5.6)
HCT VFR BLD AUTO: 43.9 % (ref 34–46.6)
HDLC SERPL-MCNC: 66 MG/DL (ref 40–60)
HGB BLD-MCNC: 14 G/DL (ref 12–15.9)
IMM GRANULOCYTES # BLD AUTO: 0.01 10*3/MM3 (ref 0–0.05)
IMM GRANULOCYTES NFR BLD AUTO: 0.2 % (ref 0–0.5)
LDLC SERPL CALC-MCNC: 92 MG/DL (ref 0–100)
LDLC/HDLC SERPL: 1.35 {RATIO}
LYMPHOCYTES # BLD AUTO: 2.47 10*3/MM3 (ref 0.7–3.1)
LYMPHOCYTES NFR BLD AUTO: 40.6 % (ref 19.6–45.3)
MCH RBC QN AUTO: 28.7 PG (ref 26.6–33)
MCHC RBC AUTO-ENTMCNC: 31.9 G/DL (ref 31.5–35.7)
MCV RBC AUTO: 90.1 FL (ref 79–97)
MONOCYTES # BLD AUTO: 0.4 10*3/MM3 (ref 0.1–0.9)
MONOCYTES NFR BLD AUTO: 6.6 % (ref 5–12)
NEUTROPHILS NFR BLD AUTO: 3.14 10*3/MM3 (ref 1.7–7)
NEUTROPHILS NFR BLD AUTO: 51.4 % (ref 42.7–76)
NRBC BLD AUTO-RTO: 0 /100 WBC (ref 0–0.2)
PLATELET # BLD AUTO: 286 10*3/MM3 (ref 140–450)
PMV BLD AUTO: 11.1 FL (ref 6–12)
POTASSIUM SERPL-SCNC: 4.3 MMOL/L (ref 3.5–5.2)
PROT SERPL-MCNC: 7.5 G/DL (ref 6–8.5)
RBC # BLD AUTO: 4.87 10*6/MM3 (ref 3.77–5.28)
SODIUM SERPL-SCNC: 141 MMOL/L (ref 136–145)
TRIGL SERPL-MCNC: 125 MG/DL (ref 0–150)
VLDLC SERPL-MCNC: 22 MG/DL (ref 5–40)
WBC NRBC COR # BLD AUTO: 6.09 10*3/MM3 (ref 3.4–10.8)

## 2024-12-10 PROCEDURE — 36415 COLL VENOUS BLD VENIPUNCTURE: CPT | Performed by: FAMILY MEDICINE

## 2024-12-10 PROCEDURE — 82306 VITAMIN D 25 HYDROXY: CPT | Performed by: FAMILY MEDICINE

## 2024-12-10 PROCEDURE — 85025 COMPLETE CBC W/AUTO DIFF WBC: CPT | Performed by: FAMILY MEDICINE

## 2024-12-10 PROCEDURE — 3044F HG A1C LEVEL LT 7.0%: CPT | Performed by: FAMILY MEDICINE

## 2024-12-10 PROCEDURE — G2211 COMPLEX E/M VISIT ADD ON: HCPCS | Performed by: FAMILY MEDICINE

## 2024-12-10 PROCEDURE — 99214 OFFICE O/P EST MOD 30 MIN: CPT | Performed by: FAMILY MEDICINE

## 2024-12-10 PROCEDURE — 80061 LIPID PANEL: CPT | Performed by: FAMILY MEDICINE

## 2024-12-10 PROCEDURE — 1126F AMNT PAIN NOTED NONE PRSNT: CPT | Performed by: FAMILY MEDICINE

## 2024-12-10 PROCEDURE — 80053 COMPREHEN METABOLIC PANEL: CPT | Performed by: FAMILY MEDICINE

## 2024-12-10 PROCEDURE — 83036 HEMOGLOBIN GLYCOSYLATED A1C: CPT | Performed by: FAMILY MEDICINE

## 2024-12-10 RX ORDER — ATORVASTATIN CALCIUM 10 MG/1
10 TABLET, FILM COATED ORAL DAILY
Qty: 90 TABLET | Refills: 3 | Status: SHIPPED | OUTPATIENT
Start: 2024-12-10

## 2024-12-10 RX ORDER — LORATADINE 10 MG/1
TABLET ORAL
COMMUNITY
Start: 2024-10-08 | End: 2024-12-10 | Stop reason: SDUPTHER

## 2024-12-10 RX ORDER — ERGOCALCIFEROL 1.25 MG/1
50000 CAPSULE, LIQUID FILLED ORAL
Qty: 13 CAPSULE | Refills: 3 | Status: SHIPPED | OUTPATIENT
Start: 2024-12-10

## 2024-12-10 RX ORDER — LORATADINE 10 MG/1
10 TABLET ORAL DAILY
Qty: 90 TABLET | Refills: 3 | Status: SHIPPED | OUTPATIENT
Start: 2024-12-10

## 2024-12-10 RX ORDER — CARBOXYMETHYLCELLULOSE SODIUM, GLYCERIN 5; 9 MG/ML; MG/ML
1 SOLUTION/ DROPS OPHTHALMIC 3 TIMES DAILY PRN
Qty: 30 ML | Refills: 3 | Status: SHIPPED | OUTPATIENT
Start: 2024-12-10

## 2024-12-10 RX ORDER — ALENDRONATE SODIUM 70 MG/1
70 TABLET ORAL
Qty: 13 TABLET | Refills: 1 | Status: SHIPPED | OUTPATIENT
Start: 2024-12-10

## 2024-12-10 NOTE — PROGRESS NOTES
Chief Complaint  diabetes    Subjective          Trish Cool presents to [facility]    History of Present Illness     History of Present Illness  The patient is a 69-year-old female who presents today for a follow-up appointment.    She has been experiencing intermittent, sharp leg pain for the past few weeks. There is no associated muscle tightness or swelling in the legs. She maintains an active lifestyle with frequent walking. She has no history of blood clots.    She has osteoporosis and is currently on a regimen of calcium and vitamin D supplements. She has previously declined the use of Fosamax due to an allergy. She has also declined a DEXA scan, having undergone one in the past.    She is currently taking atorvastatin 10 mg daily for cholesterol management. Her LDL level was 67 at the last check.    She is currently taking metformin for diabetes management. Her A1c was 6.3% at the last check, indicating good control of her diabetes.    She is currently taking Claritin for allergy management. She previously used Nasacort for ear itching, but she no longer needs it as her symptoms have resolved.    She is scheduled for a mammogram on Thursday.    She has dry eyes and is requesting a prescription for Refresh Tears.    ALLERGIES  The patient is allergic to FOSAMAX.    MEDICATIONS  Current: Atorvastatin, metformin, calcium, vitamin D, Claritin.  Discontinued: Nasacort.    IMMUNIZATIONS  She received a tetanus shot last time. She received a pneumococcal vaccination in 2021 and is due for another one.         Current Outpatient Medications   Medication Instructions    alendronate (FOSAMAX) 70 mg, Oral, Every 7 Days    atorvastatin (LIPITOR) 10 mg, Oral, Daily    Calcium Carbonate 1,200 mg, Oral, Daily    Carboxymethylcell-Glycerin PF (Refresh Tears PF) 0.5-0.9 % solution 1 drop, Ophthalmic, 3 Times Daily PRN    Diclofenac Sodium (VOLTAREN) 4 g, Topical, 4 Times Daily PRN     "hydrOXYzine (ATARAX) 25 mg, Oral, 3 Times Daily PRN    lidocaine (LIDODERM) 5 % 1 patch, Transdermal, Every 24 Hours, Remove & Discard patch within 12 hours or as directed by provider    loratadine (CLARITIN) 10 mg, Oral, Daily    metFORMIN (GLUCOPHAGE) 500 mg, Oral, Daily With Breakfast    Triamcinolone Acetonide (Nasacort Allergy 24HR) 55 MCG/ACT nasal inhaler 2 sprays, Nasal, Daily    vitamin D (ERGOCALCIFEROL) 50,000 Units, Oral, Every 7 Days       The following portions of the patient's history were reviewed and updated as appropriate: allergies, current medications, past family history, past medical history, past social history, past surgical history, and problem list.    Objective   Vital Signs:   /72 (BP Location: Left arm, Patient Position: Sitting, Cuff Size: Adult)   Pulse 82   Temp 98 °F (36.7 °C) (Oral)   Ht 170.2 cm (67\")   Wt 63.2 kg (139 lb 4.8 oz)   SpO2 99%   BMI 21.82 kg/m²     BP Readings from Last 3 Encounters:   12/10/24 110/72   09/05/24 110/62   08/26/24 108/56     Wt Readings from Last 3 Encounters:   12/10/24 63.2 kg (139 lb 4.8 oz)   09/05/24 64.8 kg (142 lb 14.4 oz)   08/26/24 62.8 kg (138 lb 8 oz)     BMI is within normal parameters. No other follow-up for BMI required.     Physical Exam  Vitals reviewed.   Constitutional:       Appearance: Normal appearance.   HENT:      Head: Normocephalic and atraumatic.      Right Ear: External ear normal.      Left Ear: External ear normal.      Nose: Nose normal.   Eyes:      Conjunctiva/sclera: Conjunctivae normal.   Cardiovascular:      Rate and Rhythm: Normal rate and regular rhythm.      Heart sounds: No murmur heard.     No friction rub. No gallop.      Comments: Negative Homans' sign in the left leg  Pulmonary:      Effort: Pulmonary effort is normal.      Breath sounds: Normal breath sounds. No wheezing or rhonchi.   Abdominal:      General: Bowel sounds are normal. There is no distension.      Palpations: Abdomen is soft.      " "Tenderness: There is no abdominal tenderness.   Musculoskeletal:      Right lower leg: No edema.      Left lower leg: No edema.   Skin:     General: Skin is warm and dry.   Neurological:      Mental Status: She is alert and oriented to person, place, and time.      Cranial Nerves: No cranial nerve deficit.   Psychiatric:         Mood and Affect: Mood and affect normal.         Behavior: Behavior normal.         Thought Content: Thought content normal.         Judgment: Judgment normal.            Result Review :   The following data was reviewed by: Artis Arce DO on 12/10/2024:  Common labs          6/5/2024    10:51   Common Labs   Glucose 104    BUN 10    Creatinine 0.60    Sodium 138    Potassium 4.1    Chloride 103    Calcium 9.3    Albumin 4.5    Total Bilirubin 0.4    Alkaline Phosphatase 76    AST (SGOT) 22    ALT (SGPT) 18    WBC 5.81    Hemoglobin 13.3    Hematocrit 40.9    Platelets 220    Total Cholesterol 147    Triglycerides 115    HDL Cholesterol 60    LDL Cholesterol  67    Hemoglobin A1C 6.30    Microalbumin, Urine <1.2             No results found for: \"SARSANTIGEN\", \"COVID19\", \"RAPFLUA\", \"RAPFLUB\", \"FLUAAG\", \"FLUABDAG\", \"FLU\", \"FLUBAG\", \"RAPSCRN\", \"STREPAAG\", \"RSV\", \"POCPREGUR\", \"MONOSPOT\", \"INR\", \"LEADCAPBLD\", \"POCLEAD\", \"BILIRUBINUR\"    Results  Laboratory Studies  Vitamin D level was normal. Lipid panel showed LDL at 67. A1c was 6.3%. Blood counts and kidney function were normal.    Procedures        Assessment and Plan    Diagnoses and all orders for this visit:    1. Vitamin D deficiency (Primary)  -     Vitamin D,25-Hydroxy    2. Mixed hyperlipidemia  -     Lipid Panel    3. Type 2 diabetes mellitus with hyperglycemia, without long-term current use of insulin  -     CBC & Differential  -     Comprehensive Metabolic Panel  -     Hemoglobin A1c    4. Osteoporosis, unspecified osteoporosis type, unspecified pathological fracture presence    5. Dry eyes    Other orders  -     alendronate " (Fosamax) 70 MG tablet; Take 1 tablet by mouth Every 7 (Seven) Days.  Dispense: 13 tablet; Refill: 1  -     metFORMIN (GLUCOPHAGE) 500 MG tablet; Take 1 tablet by mouth Daily With Breakfast.  Dispense: 90 tablet; Refill: 3  -     loratadine (CLARITIN) 10 MG tablet; Take 1 tablet by mouth Daily.  Dispense: 90 tablet; Refill: 3  -     atorvastatin (Lipitor) 10 MG tablet; Take 1 tablet by mouth Daily.  Dispense: 90 tablet; Refill: 3  -     Calcium Carbonate 1500 (600 Ca) MG tablet; Take 2 tablets by mouth Daily.  Dispense: 180 tablet; Refill: 3  -     vitamin D (ERGOCALCIFEROL) 1.25 MG (64524 UT) capsule capsule; Take 1 capsule by mouth Every 7 (Seven) Days.  Dispense: 13 capsule; Refill: 3  -     Carboxymethylcell-Glycerin PF (Refresh Tears PF) 0.5-0.9 % solution; Apply 1 drop to eye(s) as directed by provider 3 (Three) Times a Day As Needed (dry eyes).  Dispense: 30 mL; Refill: 3        Assessment & Plan  1. Osteoporosis.  She has been diagnosed with osteoporosis, a condition characterized by bone thinning. She has previously declined a DEXA scan in 2020 and continues to do so. She will persist with her calcium and vitamin D supplementation. A prescription for Fosamax, to be taken once weekly with an 8-ounce glass of water, has been provided. She has been advised to consider a DEXA scan in the future to assess bone density.    2. Hyperlipidemia.  Her cholesterol levels have shown significant improvement, with an LDL level of 67, indicating effective management with atorvastatin 10 mg daily. She will continue her current atorvastatin regimen.    3. Diabetes Mellitus.  Her diabetes is well-controlled, as evidenced by an A1c level of 6.3%. She will continue her current metformin regimen. A prescription refill for metformin has been provided.    4. Allergic Rhinitis.  She will continue her current Claritin regimen. A prescription refill for Claritin has been provided.    5. Health Maintenance.  She is due for routine  laboratory tests, including CBC, CMP, and lipid panel, which will be conducted today. She is also due for a pneumococcal vaccine, which will be administered during her next visit. She has a scheduled mammogram on Thursday.    6. Dry Eyes.  She has been advised to use Refresh Tears regularly to manage her dry eyes. A prescription for Refresh Tears has been provided.    7. Leg Pain.  She reports experiencing sharp pain in her leg intermittently. The pain does not appear to be associated with swelling or blood clots. She has been advised to perform regular stretching exercises to alleviate the pain. If the pain persists, she will inform the provider.    Follow-up  The patient is scheduled for a follow-up visit in 6 months.       Medications Discontinued During This Encounter   Medication Reason    levocetirizine (XYZAL) 5 MG tablet     Calcium Carbonate 1500 (600 Ca) MG tablet Reorder    atorvastatin (Lipitor) 10 MG tablet Reorder    metFORMIN (GLUCOPHAGE) 500 MG tablet Reorder    vitamin D (ERGOCALCIFEROL) 1.25 MG (20153 UT) capsule capsule Reorder    loratadine (CLARITIN) 10 MG tablet Reorder          Follow Up   Return in about 6 months (around 6/10/2025) for diabetes.  Patient was given instructions and counseling regarding her condition or for health maintenance advice. Please see specific information pulled into the AVS if appropriate.     Patient or patient representative verbalized consent for the use of Ambient Listening during the visit with  Artis Arce DO for chart documentation. 12/10/2024  10:47 VICTOR HUGO Arce DO  12/10/24  11:01 EST

## 2024-12-12 ENCOUNTER — HOSPITAL ENCOUNTER (OUTPATIENT)
Dept: MAMMOGRAPHY | Facility: HOSPITAL | Age: 69
Discharge: HOME | End: 2024-12-12
Admitting: FAMILY MEDICINE
Payer: MEDICARE

## 2024-12-12 DIAGNOSIS — Z12.31 VISIT FOR SCREENING MAMMOGRAM: ICD-10-CM

## 2024-12-12 PROCEDURE — 77067 SCR MAMMO BI INCL CAD: CPT

## 2024-12-12 PROCEDURE — 77063 BREAST TOMOSYNTHESIS BI: CPT

## 2024-12-30 ENCOUNTER — TELEPHONE (OUTPATIENT)
Dept: FAMILY MEDICINE CLINIC | Facility: CLINIC | Age: 69
End: 2024-12-30
Payer: MEDICARE

## 2024-12-30 RX ORDER — CARBOXYMETHYLCELLULOSE SODIUM 5 MG/ML
2 SOLUTION/ DROPS OPHTHALMIC 3 TIMES DAILY PRN
Qty: 30 ML | Refills: 3 | Status: SHIPPED | OUTPATIENT
Start: 2024-12-30

## 2024-12-30 NOTE — TELEPHONE ENCOUNTER
Phone call to patient to inform of scripts sent into the pharmacy for Refresh tears with voiced appreciation.

## 2024-12-30 NOTE — TELEPHONE ENCOUNTER
I sent in a different prescription for Refresh Tears.  Please inform patient.  If she has any further issues please have her contact us.

## 2024-12-30 NOTE — TELEPHONE ENCOUNTER
Pt states her pharmacy does not carry Carboxymethylcell-Glycerin PF (Refresh Tears PF) 0.5-0.9 % solution , and she would like something else called in, instead. Please advise thank you

## 2025-01-02 ENCOUNTER — CLINICAL SUPPORT (OUTPATIENT)
Dept: FAMILY MEDICINE CLINIC | Facility: CLINIC | Age: 70
End: 2025-01-02
Payer: MEDICARE

## 2025-01-02 ENCOUNTER — TELEPHONE (OUTPATIENT)
Dept: FAMILY MEDICINE CLINIC | Facility: CLINIC | Age: 70
End: 2025-01-02

## 2025-01-02 DIAGNOSIS — Z23 NEED FOR PNEUMOCOCCAL 20-VALENT CONJUGATE VACCINATION: Primary | ICD-10-CM

## 2025-01-02 PROCEDURE — G0008 ADMIN INFLUENZA VIRUS VAC: HCPCS | Performed by: FAMILY MEDICINE

## 2025-01-02 PROCEDURE — 90677 PCV20 VACCINE IM: CPT | Performed by: FAMILY MEDICINE

## 2025-01-02 NOTE — TELEPHONE ENCOUNTER
Please inform patient that I will send in a prescription for calcium and vitamin D for her to take together.  She will also continue with the once weekly vitamin D supplementation at this time.  Prescription sent to [facility].

## 2025-01-16 ENCOUNTER — OFFICE VISIT (OUTPATIENT)
Dept: FAMILY MEDICINE CLINIC | Facility: CLINIC | Age: 70
End: 2025-01-16
Payer: MEDICARE

## 2025-01-16 ENCOUNTER — HOSPITAL ENCOUNTER (OUTPATIENT)
Dept: MRI IMAGING | Facility: HOSPITAL | Age: 70
Discharge: HOME | End: 2025-01-16
Payer: MEDICARE

## 2025-01-16 ENCOUNTER — HOSPITAL ENCOUNTER (OUTPATIENT)
Dept: GENERAL RADIOLOGY | Facility: HOSPITAL | Age: 70
Discharge: HOME | End: 2025-01-16
Payer: MEDICARE

## 2025-01-16 VITALS
WEIGHT: 138.8 LBS | TEMPERATURE: 97.7 F | HEART RATE: 76 BPM | SYSTOLIC BLOOD PRESSURE: 112 MMHG | OXYGEN SATURATION: 98 % | HEIGHT: 67 IN | DIASTOLIC BLOOD PRESSURE: 74 MMHG | BODY MASS INDEX: 21.79 KG/M2

## 2025-01-16 DIAGNOSIS — M79.645 PAIN OF LEFT THUMB: Primary | ICD-10-CM

## 2025-01-16 DIAGNOSIS — M79.641 CHRONIC HAND PAIN, RIGHT: ICD-10-CM

## 2025-01-16 DIAGNOSIS — M79.645 PAIN OF LEFT THUMB: ICD-10-CM

## 2025-01-16 DIAGNOSIS — M79.642 LEFT HAND PAIN: ICD-10-CM

## 2025-01-16 DIAGNOSIS — G89.29 CHRONIC HAND PAIN, RIGHT: ICD-10-CM

## 2025-01-16 LAB
CRP SERPL-MCNC: <0.3 MG/DL (ref 0–0.5)
URATE SERPL-MCNC: 3.8 MG/DL (ref 2.4–5.7)

## 2025-01-16 PROCEDURE — 73130 X-RAY EXAM OF HAND: CPT

## 2025-01-16 PROCEDURE — 84550 ASSAY OF BLOOD/URIC ACID: CPT | Performed by: FAMILY MEDICINE

## 2025-01-16 PROCEDURE — 73218 MRI UPPER EXTREMITY W/O DYE: CPT

## 2025-01-16 PROCEDURE — 86140 C-REACTIVE PROTEIN: CPT | Performed by: FAMILY MEDICINE

## 2025-01-16 RX ORDER — KETOROLAC TROMETHAMINE 30 MG/ML
30 INJECTION, SOLUTION INTRAMUSCULAR; INTRAVENOUS EVERY 6 HOURS PRN
Status: SHIPPED | OUTPATIENT
Start: 2025-01-16 | End: 2025-01-21

## 2025-01-16 RX ORDER — INDOMETHACIN 50 MG/1
50 CAPSULE ORAL 3 TIMES DAILY PRN
Qty: 21 CAPSULE | Refills: 0 | Status: SHIPPED | OUTPATIENT
Start: 2025-01-16

## 2025-01-16 RX ORDER — PREDNISONE 20 MG/1
TABLET ORAL
Qty: 13 TABLET | Refills: 0 | Status: SHIPPED | OUTPATIENT
Start: 2025-01-16

## 2025-01-16 RX ORDER — TRIAMCINOLONE ACETONIDE 40 MG/ML
40 INJECTION, SUSPENSION INTRA-ARTICULAR; INTRAMUSCULAR ONCE
Status: COMPLETED | OUTPATIENT
Start: 2025-01-16 | End: 2025-01-16

## 2025-01-16 RX ADMIN — KETOROLAC TROMETHAMINE 30 MG: 30 INJECTION, SOLUTION INTRAMUSCULAR; INTRAVENOUS at 10:10

## 2025-01-16 RX ADMIN — TRIAMCINOLONE ACETONIDE 40 MG: 40 INJECTION, SUSPENSION INTRA-ARTICULAR; INTRAMUSCULAR at 10:14

## 2025-01-16 NOTE — PROGRESS NOTES
Chief Complaint  Hand Pain (Fingers on both hands)    Irwin Cool presents to [facility]    History of Present Illness     History of Present Illness  The patient is a 69-year-old female who presents today complaining of finger pain.    She reports experiencing pain in her left thumb, which has been persistent for approximately 1 month and has worsened over the past week. The pain is so severe that it impedes her ability to perform daily activities such as dishwashing. She also notes a clicking sound in her finger, although this does not cause any discomfort. She has not experienced any recent injuries to the thumb. Additionally, she mentions a mild swelling in the affected area. She has been using Voltaren cream for pain management, which she applies to her back. She reports no history of gout or symptoms suggestive of carpal tunnel syndrome, such as numbness or tingling. She also reports pain in her right hand, but it is not as severe as the left thumb pain.    MEDICATIONS  Current: Voltaren gel         Current Outpatient Medications   Medication Instructions    alendronate (FOSAMAX) 70 mg, Oral, Every 7 Days    atorvastatin (LIPITOR) 10 mg, Oral, Daily    Calcium Carbonate-Vitamin D 600-10 MG-MCG per tablet 2 tablets, Oral, Daily    carboxymethylcellulose (Refresh Tears) 0.5 % solution 2 drops, Both Eyes, 3 Times Daily PRN    Diclofenac Sodium (VOLTAREN) 4 g, Topical, 4 Times Daily PRN    hydrOXYzine (ATARAX) 25 mg, Oral, 3 Times Daily PRN    indomethacin (INDOCIN) 50 mg, Oral, 3 Times Daily PRN    lidocaine (LIDODERM) 5 % 1 patch, Transdermal, Every 24 Hours, Remove & Discard patch within 12 hours or as directed by provider    loratadine (CLARITIN) 10 mg, Oral, Daily    metFORMIN (GLUCOPHAGE) 500 mg, Oral, Daily With Breakfast    predniSONE (DELTASONE) 20 MG tablet Take 2 tablets PO daily x 4 days, then take 1 tablet PO x 4 days, then take 1/2 tablet PO  "daily x 2 days    Triamcinolone Acetonide (Nasacort Allergy 24HR) 55 MCG/ACT nasal inhaler 2 sprays, Nasal, Daily    vitamin D (ERGOCALCIFEROL) 50,000 Units, Oral, Every 7 Days       The following portions of the patient's history were reviewed and updated as appropriate: allergies, current medications, past family history, past medical history, past social history, past surgical history, and problem list.    Objective   Vital Signs:   /74   Pulse 76   Temp 97.7 °F (36.5 °C) (Oral)   Ht 170.2 cm (67\")   Wt 63 kg (138 lb 12.8 oz)   SpO2 98%   BMI 21.74 kg/m²     BP Readings from Last 3 Encounters:   01/16/25 112/74   12/10/24 110/72   09/05/24 110/62     Wt Readings from Last 3 Encounters:   01/16/25 63 kg (138 lb 12.8 oz)   12/10/24 63.2 kg (139 lb 4.8 oz)   09/05/24 64.8 kg (142 lb 14.4 oz)     BMI is within normal parameters. No other follow-up for BMI required.     Physical Exam  Vitals reviewed.   Constitutional:       Appearance: Normal appearance.   HENT:      Head: Normocephalic and atraumatic.      Right Ear: External ear normal.      Left Ear: External ear normal.      Nose: Nose normal.   Eyes:      Conjunctiva/sclera: Conjunctivae normal.   Cardiovascular:      Rate and Rhythm: Normal rate and regular rhythm.      Heart sounds: No murmur heard.     No friction rub. No gallop.   Pulmonary:      Effort: Pulmonary effort is normal.      Breath sounds: Normal breath sounds. No wheezing or rhonchi.   Abdominal:      General: Bowel sounds are normal. There is no distension.      Palpations: Abdomen is soft.      Tenderness: There is no abdominal tenderness.   Musculoskeletal:      Comments: Patient has a lot of pain in the left thumb area.  She points to pain over the IP joint as well as the MCP joint specifically on the dorsal aspect.  She also has tenderness at the volar aspect of the MCP joint of the left hand.  She is unable to bend the IP joint and she has a lot of pain trying to bend the MCP " "joint.  The thumb CMC grind test is negative.  She does not have any joint hypertrophy noted.  The joint is otherwise in both the left and the right hand did not exhibit any evidence of hypertrophy or tenderness.   Skin:     General: Skin is warm and dry.   Neurological:      Mental Status: She is alert and oriented to person, place, and time.      Cranial Nerves: No cranial nerve deficit.   Psychiatric:         Mood and Affect: Mood and affect normal.         Behavior: Behavior normal.         Thought Content: Thought content normal.         Judgment: Judgment normal.            Result Review :   The following data was reviewed by: Artis Arce DO on 01/16/2025:  Common labs          6/5/2024    10:51 12/10/2024    11:06   Common Labs   Glucose 104  101    BUN 10  12    Creatinine 0.60  0.58    Sodium 138  141    Potassium 4.1  4.3    Chloride 103  104    Calcium 9.3  10.1    Albumin 4.5  4.5    Total Bilirubin 0.4  0.5    Alkaline Phosphatase 76  93    AST (SGOT) 22  27    ALT (SGPT) 18  22    WBC 5.81  6.09    Hemoglobin 13.3  14.0    Hematocrit 40.9  43.9    Platelets 220  286    Total Cholesterol 147  180    Triglycerides 115  125    HDL Cholesterol 60  66    LDL Cholesterol  67  92    Hemoglobin A1C 6.30  6.70    Microalbumin, Urine <1.2              No results found for: \"SARSANTIGEN\", \"COVID19\", \"RAPFLUA\", \"RAPFLUB\", \"FLUAAG\", \"FLUABDAG\", \"FLU\", \"FLUBAG\", \"RAPSCRN\", \"STREPAAG\", \"RSV\", \"POCPREGUR\", \"MONOSPOT\", \"INR\", \"LEADCAPBLD\", \"POCLEAD\", \"BILIRUBINUR\"    Results      Procedures        Assessment and Plan    Diagnoses and all orders for this visit:    1. Pain of left thumb (Primary)  -     MRI Hand Left Without Contrast; Future  -     XR Hand 3+ View Left; Future  -     Uric Acid  -     C-reactive protein    2. Chronic hand pain, right  -     XR Hand 3+ View Right; Future    3. Left hand pain  -     MRI Hand Left Without Contrast; Future  -     XR Hand 3+ View Left; Future    Other orders  -     " predniSONE (DELTASONE) 20 MG tablet; Take 2 tablets PO daily x 4 days, then take 1 tablet PO x 4 days, then take 1/2 tablet PO daily x 2 days  Dispense: 13 tablet; Refill: 0  -     indomethacin (INDOCIN) 50 MG capsule; Take 1 capsule by mouth 3 (Three) Times a Day As Needed for Moderate Pain.  Dispense: 21 capsule; Refill: 0        Assessment & Plan  1. Pain in the left thumb.  The patient reports severe pain in the MCP joint and IP joint of the left thumb for the past month, worsening in the past week. The pain is so intense that she can not move her thumb or perform daily activities like washing dishes. Differential diagnosis includes trigger thumb, but the severity of the pain suggests another underlying issue possibly. An MRI of the left hand will be ordered stat to investigate further. A Kenalog injection and a Toradol injection will be administered today for immediate relief. She will be prescribed prednisone for 10 days and indomethacin to be taken 3 times daily as needed for pain. She is advised to continue using Voltaren gel. X-rays of both hands will also be ordered. Blood work will be conducted to check for gout levels.  Gout is considered in the differential as well.    PROCEDURE  A Kenalog injection and a Toradol injection were administered today.       There are no discontinued medications.       Follow Up   Return if symptoms worsen or fail to improve.  Patient was given instructions and counseling regarding her condition or for health maintenance advice. Please see specific information pulled into the AVS if appropriate.     Patient or patient representative verbalized consent for the use of Ambient Listening during the visit with  Artis Arce DO for chart documentation. 1/16/2025  09:26 EST    Artis Arce DO  01/16/25  09:26 EST

## 2025-08-18 ENCOUNTER — OFFICE VISIT (OUTPATIENT)
Dept: FAMILY MEDICINE CLINIC | Facility: CLINIC | Age: 70
End: 2025-08-18
Payer: MEDICARE

## 2025-08-18 VITALS
SYSTOLIC BLOOD PRESSURE: 102 MMHG | TEMPERATURE: 97.2 F | WEIGHT: 139.1 LBS | HEART RATE: 74 BPM | DIASTOLIC BLOOD PRESSURE: 58 MMHG | BODY MASS INDEX: 21.83 KG/M2 | HEIGHT: 67 IN | OXYGEN SATURATION: 97 %

## 2025-08-18 DIAGNOSIS — E78.2 MIXED HYPERLIPIDEMIA: ICD-10-CM

## 2025-08-18 DIAGNOSIS — R19.7 DIARRHEA, UNSPECIFIED TYPE: ICD-10-CM

## 2025-08-18 DIAGNOSIS — E55.9 VITAMIN D DEFICIENCY: ICD-10-CM

## 2025-08-18 DIAGNOSIS — M81.0 OSTEOPOROSIS, UNSPECIFIED OSTEOPOROSIS TYPE, UNSPECIFIED PATHOLOGICAL FRACTURE PRESENCE: ICD-10-CM

## 2025-08-18 DIAGNOSIS — J30.9 ALLERGIC RHINITIS, UNSPECIFIED SEASONALITY, UNSPECIFIED TRIGGER: ICD-10-CM

## 2025-08-18 DIAGNOSIS — N94.89 PELVIC CONGESTION: ICD-10-CM

## 2025-08-18 DIAGNOSIS — R10.2 PELVIC PAIN: ICD-10-CM

## 2025-08-18 DIAGNOSIS — D25.9 UTERINE LEIOMYOMA, UNSPECIFIED LOCATION: ICD-10-CM

## 2025-08-18 DIAGNOSIS — M79.645 PAIN OF LEFT THUMB: ICD-10-CM

## 2025-08-18 DIAGNOSIS — R10.32 LEFT LOWER QUADRANT PAIN: ICD-10-CM

## 2025-08-18 DIAGNOSIS — Z51.81 MEDICATION MONITORING ENCOUNTER: ICD-10-CM

## 2025-08-18 DIAGNOSIS — E11.65 TYPE 2 DIABETES MELLITUS WITH HYPERGLYCEMIA, WITHOUT LONG-TERM CURRENT USE OF INSULIN: ICD-10-CM

## 2025-08-18 DIAGNOSIS — Z12.31 VISIT FOR SCREENING MAMMOGRAM: ICD-10-CM

## 2025-08-18 DIAGNOSIS — K57.90 DIVERTICULOSIS: ICD-10-CM

## 2025-08-18 DIAGNOSIS — Z00.00 MEDICARE ANNUAL WELLNESS VISIT, SUBSEQUENT: Primary | ICD-10-CM

## 2025-08-18 LAB
25(OH)D3 SERPL-MCNC: 62.7 NG/ML (ref 30–100)
ALBUMIN SERPL-MCNC: 4.3 G/DL (ref 3.5–5.2)
ALBUMIN/GLOB SERPL: 1.6 G/DL
ALP SERPL-CCNC: 84 U/L (ref 39–117)
ALT SERPL W P-5'-P-CCNC: 18 U/L (ref 1–33)
ANION GAP SERPL CALCULATED.3IONS-SCNC: 12 MMOL/L (ref 5–15)
AST SERPL-CCNC: 19 U/L (ref 1–32)
BASOPHILS # BLD AUTO: 0.04 10*3/MM3 (ref 0–0.2)
BASOPHILS NFR BLD AUTO: 0.6 % (ref 0–1.5)
BILIRUB SERPL-MCNC: 0.2 MG/DL (ref 0–1.2)
BUN SERPL-MCNC: 14 MG/DL (ref 8–23)
BUN/CREAT SERPL: 17.7 (ref 7–25)
CALCIUM SPEC-SCNC: 9.2 MG/DL (ref 8.6–10.5)
CHLORIDE SERPL-SCNC: 104 MMOL/L (ref 98–107)
CHOLEST SERPL-MCNC: 147 MG/DL (ref 0–200)
CO2 SERPL-SCNC: 26 MMOL/L (ref 22–29)
CREAT SERPL-MCNC: 0.79 MG/DL (ref 0.57–1)
CRP SERPL-MCNC: <0.3 MG/DL (ref 0–0.5)
DEPRECATED RDW RBC AUTO: 38.9 FL (ref 37–54)
EGFRCR SERPLBLD CKD-EPI 2021: 80.6 ML/MIN/1.73
EOSINOPHIL # BLD AUTO: 0.04 10*3/MM3 (ref 0–0.4)
EOSINOPHIL NFR BLD AUTO: 0.6 % (ref 0.3–6.2)
ERYTHROCYTE [DISTWIDTH] IN BLOOD BY AUTOMATED COUNT: 11.6 % (ref 12.3–15.4)
GLOBULIN UR ELPH-MCNC: 2.7 GM/DL
GLUCOSE SERPL-MCNC: 150 MG/DL (ref 65–99)
HBA1C MFR BLD: 6.5 % (ref 4.8–5.6)
HCT VFR BLD AUTO: 40.3 % (ref 34–46.6)
HDLC SERPL-MCNC: 52 MG/DL (ref 40–60)
HGB BLD-MCNC: 13 G/DL (ref 12–15.9)
IMM GRANULOCYTES # BLD AUTO: 0.02 10*3/MM3 (ref 0–0.05)
IMM GRANULOCYTES NFR BLD AUTO: 0.3 % (ref 0–0.5)
LDLC SERPL CALC-MCNC: 60 MG/DL (ref 0–100)
LDLC/HDLC SERPL: 0.99 {RATIO}
LIPASE SERPL-CCNC: 33 U/L (ref 13–60)
LYMPHOCYTES # BLD AUTO: 2.36 10*3/MM3 (ref 0.7–3.1)
LYMPHOCYTES NFR BLD AUTO: 35.6 % (ref 19.6–45.3)
MCH RBC QN AUTO: 29.8 PG (ref 26.6–33)
MCHC RBC AUTO-ENTMCNC: 32.3 G/DL (ref 31.5–35.7)
MCV RBC AUTO: 92.4 FL (ref 79–97)
MONOCYTES # BLD AUTO: 0.4 10*3/MM3 (ref 0.1–0.9)
MONOCYTES NFR BLD AUTO: 6 % (ref 5–12)
NEUTROPHILS NFR BLD AUTO: 3.77 10*3/MM3 (ref 1.7–7)
NEUTROPHILS NFR BLD AUTO: 56.9 % (ref 42.7–76)
NRBC BLD AUTO-RTO: 0 /100 WBC (ref 0–0.2)
PLATELET # BLD AUTO: 252 10*3/MM3 (ref 140–450)
PMV BLD AUTO: 10.8 FL (ref 6–12)
POTASSIUM SERPL-SCNC: 4 MMOL/L (ref 3.5–5.2)
PROT SERPL-MCNC: 7 G/DL (ref 6–8.5)
RBC # BLD AUTO: 4.36 10*6/MM3 (ref 3.77–5.28)
SODIUM SERPL-SCNC: 142 MMOL/L (ref 136–145)
TRIGL SERPL-MCNC: 218 MG/DL (ref 0–150)
VLDLC SERPL-MCNC: 35 MG/DL (ref 5–40)
WBC NRBC COR # BLD AUTO: 6.63 10*3/MM3 (ref 3.4–10.8)

## 2025-08-18 PROCEDURE — 80061 LIPID PANEL: CPT | Performed by: FAMILY MEDICINE

## 2025-08-18 PROCEDURE — 1170F FXNL STATUS ASSESSED: CPT | Performed by: FAMILY MEDICINE

## 2025-08-18 PROCEDURE — 86140 C-REACTIVE PROTEIN: CPT | Performed by: FAMILY MEDICINE

## 2025-08-18 PROCEDURE — 83690 ASSAY OF LIPASE: CPT | Performed by: FAMILY MEDICINE

## 2025-08-18 PROCEDURE — 82306 VITAMIN D 25 HYDROXY: CPT | Performed by: FAMILY MEDICINE

## 2025-08-18 PROCEDURE — 1126F AMNT PAIN NOTED NONE PRSNT: CPT | Performed by: FAMILY MEDICINE

## 2025-08-18 PROCEDURE — 83036 HEMOGLOBIN GLYCOSYLATED A1C: CPT | Performed by: FAMILY MEDICINE

## 2025-08-18 PROCEDURE — 85025 COMPLETE CBC W/AUTO DIFF WBC: CPT | Performed by: FAMILY MEDICINE

## 2025-08-18 PROCEDURE — 99214 OFFICE O/P EST MOD 30 MIN: CPT | Performed by: FAMILY MEDICINE

## 2025-08-18 PROCEDURE — 80053 COMPREHEN METABOLIC PANEL: CPT | Performed by: FAMILY MEDICINE

## 2025-08-18 PROCEDURE — G0439 PPPS, SUBSEQ VISIT: HCPCS | Performed by: FAMILY MEDICINE

## 2025-08-18 RX ORDER — ERGOCALCIFEROL 1.25 MG/1
50000 CAPSULE, LIQUID FILLED ORAL
Qty: 13 CAPSULE | Refills: 3 | Status: SHIPPED | OUTPATIENT
Start: 2025-08-18

## 2025-08-18 RX ORDER — LEVOFLOXACIN 750 MG/1
750 TABLET, FILM COATED ORAL DAILY
Qty: 10 TABLET | Refills: 0 | Status: SHIPPED | OUTPATIENT
Start: 2025-08-18 | End: 2025-08-28

## 2025-08-18 RX ORDER — LORATADINE 10 MG/1
10 TABLET ORAL DAILY
Qty: 90 TABLET | Refills: 3 | Status: SHIPPED | OUTPATIENT
Start: 2025-08-18

## 2025-08-18 RX ORDER — ALENDRONATE SODIUM 70 MG/1
70 TABLET ORAL
Qty: 13 TABLET | Refills: 3 | Status: SHIPPED | OUTPATIENT
Start: 2025-08-18

## 2025-08-18 RX ORDER — METRONIDAZOLE 500 MG/1
500 TABLET ORAL 3 TIMES DAILY
Qty: 30 TABLET | Refills: 0 | Status: SHIPPED | OUTPATIENT
Start: 2025-08-18 | End: 2025-08-28

## 2025-08-18 RX ORDER — CARBOXYMETHYLCELLULOSE SODIUM 5 MG/ML
2 SOLUTION/ DROPS OPHTHALMIC 3 TIMES DAILY PRN
Qty: 30 ML | Refills: 3 | Status: SHIPPED | OUTPATIENT
Start: 2025-08-18

## 2025-08-18 RX ORDER — ATORVASTATIN CALCIUM 10 MG/1
10 TABLET, FILM COATED ORAL DAILY
Qty: 90 TABLET | Refills: 3 | Status: SHIPPED | OUTPATIENT
Start: 2025-08-18

## 2025-08-25 ENCOUNTER — LAB (OUTPATIENT)
Dept: LAB | Facility: HOSPITAL | Age: 70
End: 2025-08-25
Payer: MEDICARE

## 2025-08-25 DIAGNOSIS — R10.32 LEFT LOWER QUADRANT PAIN: ICD-10-CM

## 2025-08-25 DIAGNOSIS — R19.7 DIARRHEA, UNSPECIFIED TYPE: ICD-10-CM

## 2025-08-25 LAB
027 TOXIN: NORMAL
ALBUMIN UR-MCNC: <1.2 MG/DL
BACTERIA UR QL AUTO: NORMAL /HPF
BILIRUB UR QL STRIP: NEGATIVE
C DIFF TOX GENS STL QL NAA+PROBE: NEGATIVE
CLARITY UR: CLEAR
COLOR UR: YELLOW
CREAT UR-MCNC: 68.5 MG/DL
GLUCOSE UR STRIP-MCNC: NEGATIVE MG/DL
H. PYLORI ANTIGEN STOOL: NEGATIVE
HEMOCCULT STL QL IA: NEGATIVE
HGB UR QL STRIP.AUTO: NEGATIVE
HOLD SPECIMEN: NORMAL
HYALINE CASTS UR QL AUTO: NORMAL /LPF
KETONES UR QL STRIP: NEGATIVE
LACTOFERRIN STL QL LA: NEGATIVE
LEUKOCYTE ESTERASE UR QL STRIP.AUTO: ABNORMAL
MICROALBUMIN/CREAT UR: NORMAL MG/G{CREAT}
NITRITE UR QL STRIP: NEGATIVE
PH UR STRIP.AUTO: 6 [PH] (ref 5–8)
PROT UR QL STRIP: NEGATIVE
RBC # UR STRIP: NORMAL /HPF
REF LAB TEST METHOD: NORMAL
SP GR UR STRIP: 1.01 (ref 1–1.03)
SQUAMOUS #/AREA URNS HPF: NORMAL /HPF
UROBILINOGEN UR QL STRIP: ABNORMAL
WBC # UR STRIP: NORMAL /HPF

## 2025-08-25 PROCEDURE — 83630 LACTOFERRIN FECAL (QUAL): CPT

## 2025-08-25 PROCEDURE — 83993 ASSAY FOR CALPROTECTIN FECAL: CPT

## 2025-08-25 PROCEDURE — 82274 ASSAY TEST FOR BLOOD FECAL: CPT

## 2025-08-25 PROCEDURE — 87493 C DIFF AMPLIFIED PROBE: CPT

## 2025-08-25 PROCEDURE — 82653 EL-1 FECAL QUANTITATIVE: CPT

## 2025-08-25 PROCEDURE — 87338 HPYLORI STOOL AG IA: CPT

## 2025-08-25 PROCEDURE — 82570 ASSAY OF URINE CREATININE: CPT | Performed by: FAMILY MEDICINE

## 2025-08-25 PROCEDURE — 87506 IADNA-DNA/RNA PROBE TQ 6-11: CPT

## 2025-08-25 PROCEDURE — 82043 UR ALBUMIN QUANTITATIVE: CPT | Performed by: FAMILY MEDICINE

## 2025-08-25 PROCEDURE — 87086 URINE CULTURE/COLONY COUNT: CPT | Performed by: FAMILY MEDICINE

## 2025-08-25 PROCEDURE — 81001 URINALYSIS AUTO W/SCOPE: CPT | Performed by: FAMILY MEDICINE

## 2025-08-26 LAB
C COLI+JEJ+UPSA DNA STL QL NAA+NON-PROBE: NOT DETECTED
CRYPTOSP DNA STL QL NAA+NON-PROBE: NOT DETECTED
E HISTOLYT DNA STL QL NAA+NON-PROBE: NOT DETECTED
EC STX1+STX2 GENES STL QL NAA+NON-PROBE: NOT DETECTED
ELASTASE PANC STL-MCNT: >800 UG ELAST./G
G LAMBLIA DNA STL QL NAA+NON-PROBE: NOT DETECTED
S ENT+BONG DNA STL QL NAA+NON-PROBE: NOT DETECTED
SHIGELLA SP+EIEC IPAH ST NAA+NON-PROBE: NOT DETECTED

## 2025-08-27 LAB
BACTERIA SPEC AEROBE CULT: NO GROWTH
CALPROTECTIN STL-MCNT: 21 UG/G (ref 0–120)

## (undated) DEVICE — Device

## (undated) DEVICE — SOLIDIFIER LIQLOC PLS 1500CC BT

## (undated) DEVICE — CONN JET HYDRA H20 AUXILIARY DISP

## (undated) DEVICE — LINER SURG CANSTR SXN S/RIGD 1500CC

## (undated) DEVICE — SOL IRRG H2O PL/BG 1000ML STRL

## (undated) DEVICE — Device: Brand: DEFENDO AIR/WATER/SUCTION AND BIOPSY VALVE